# Patient Record
Sex: FEMALE | Race: WHITE | NOT HISPANIC OR LATINO | Employment: UNEMPLOYED | URBAN - METROPOLITAN AREA
[De-identification: names, ages, dates, MRNs, and addresses within clinical notes are randomized per-mention and may not be internally consistent; named-entity substitution may affect disease eponyms.]

---

## 2022-09-14 ENCOUNTER — OFFICE VISIT (OUTPATIENT)
Dept: FAMILY MEDICINE CLINIC | Facility: CLINIC | Age: 47
End: 2022-09-14

## 2022-09-14 VITALS
BODY MASS INDEX: 34.31 KG/M2 | SYSTOLIC BLOOD PRESSURE: 122 MMHG | TEMPERATURE: 97.1 F | WEIGHT: 201 LBS | HEART RATE: 81 BPM | HEIGHT: 64 IN | DIASTOLIC BLOOD PRESSURE: 90 MMHG | OXYGEN SATURATION: 100 %

## 2022-09-14 DIAGNOSIS — F41.9 ANXIETY: Primary | ICD-10-CM

## 2022-09-14 DIAGNOSIS — R56.9 SEIZURES: ICD-10-CM

## 2022-09-14 DIAGNOSIS — F19.10 POLYSUBSTANCE ABUSE: ICD-10-CM

## 2022-09-14 DIAGNOSIS — F33.41 RECURRENT MAJOR DEPRESSIVE DISORDER, IN PARTIAL REMISSION: ICD-10-CM

## 2022-09-14 PROCEDURE — 99204 OFFICE O/P NEW MOD 45 MIN: CPT | Performed by: PHYSICIAN ASSISTANT

## 2022-09-14 RX ORDER — DICLOFENAC SODIUM 75 MG/1
TABLET, DELAYED RELEASE ORAL
COMMUNITY
Start: 2022-07-05

## 2022-09-14 RX ORDER — GABAPENTIN 300 MG/1
300 CAPSULE ORAL 3 TIMES DAILY
COMMUNITY
Start: 2022-08-04

## 2022-09-14 RX ORDER — LEVETIRACETAM 1000 MG/1
1000 TABLET ORAL 2 TIMES DAILY
COMMUNITY
Start: 2022-07-25 | End: 2022-10-12

## 2022-09-14 RX ORDER — CLONIDINE HYDROCHLORIDE 0.1 MG/1
TABLET ORAL
COMMUNITY
Start: 2022-07-05 | End: 2022-09-14 | Stop reason: SDUPTHER

## 2022-09-14 RX ORDER — CLONIDINE HYDROCHLORIDE 0.1 MG/1
0.1 TABLET ORAL 2 TIMES DAILY
Qty: 60 TABLET | Refills: 1 | Status: SHIPPED | OUTPATIENT
Start: 2022-09-14

## 2022-09-14 RX ORDER — TOPIRAMATE 100 MG/1
100 TABLET, FILM COATED ORAL 2 TIMES DAILY
COMMUNITY
Start: 2022-08-15

## 2022-09-14 RX ORDER — DULOXETIN HYDROCHLORIDE 60 MG/1
60 CAPSULE, DELAYED RELEASE ORAL DAILY
COMMUNITY

## 2022-09-14 RX ORDER — OMEPRAZOLE 20 MG/1
20 TABLET, DELAYED RELEASE ORAL DAILY
COMMUNITY
Start: 2022-07-05 | End: 2022-09-14 | Stop reason: SDUPTHER

## 2022-09-14 RX ORDER — ARIPIPRAZOLE 2 MG/1
2 TABLET ORAL 2 TIMES DAILY
COMMUNITY
Start: 2022-08-15

## 2022-09-14 RX ORDER — GABAPENTIN 300 MG/1
300 CAPSULE ORAL 3 TIMES DAILY
Qty: 90 CAPSULE | Refills: 0 | Status: CANCELLED | OUTPATIENT
Start: 2022-09-14

## 2022-09-14 RX ORDER — IBUPROFEN 400 MG/1
400 TABLET ORAL EVERY 6 HOURS PRN
COMMUNITY
Start: 2022-08-29

## 2022-09-14 RX ORDER — RIZATRIPTAN BENZOATE 10 MG/1
10 TABLET, ORALLY DISINTEGRATING ORAL ONCE AS NEEDED
Qty: 9 TABLET | Refills: 0 | Status: SHIPPED | OUTPATIENT
Start: 2022-09-14

## 2022-09-14 RX ORDER — CLONAZEPAM 0.5 MG/1
0.5 TABLET ORAL 3 TIMES DAILY
COMMUNITY
Start: 2022-07-25

## 2022-09-14 RX ORDER — CLONAZEPAM 0.5 MG/1
0.5 TABLET ORAL 3 TIMES DAILY
Qty: 90 TABLET | Refills: 0 | Status: CANCELLED | OUTPATIENT
Start: 2022-09-14

## 2022-09-14 RX ORDER — NALOXONE HYDROCHLORIDE 4 MG/.1ML
4 SPRAY NASAL
COMMUNITY
Start: 2022-09-12 | End: 2023-09-12

## 2022-09-14 RX ORDER — OMEPRAZOLE 20 MG/1
20 TABLET, DELAYED RELEASE ORAL DAILY
Qty: 30 TABLET | Refills: 2 | Status: SHIPPED | OUTPATIENT
Start: 2022-09-14

## 2022-09-14 RX ORDER — RIZATRIPTAN BENZOATE 10 MG/1
TABLET, ORALLY DISINTEGRATING ORAL
COMMUNITY
Start: 2022-04-28 | End: 2022-09-14 | Stop reason: SDUPTHER

## 2022-09-14 RX ORDER — LOFEXIDINE HYDROCHLORIDE 0.2 MG/1
TABLET, FILM COATED ORAL
COMMUNITY
Start: 2022-07-27

## 2022-09-14 RX ORDER — BUPRENORPHINE AND NALOXONE 8; 2 MG/1; MG/1
FILM, SOLUBLE BUCCAL; SUBLINGUAL
COMMUNITY
Start: 2022-09-02

## 2022-09-14 RX ORDER — HYDROXYZINE PAMOATE 50 MG/1
50 CAPSULE ORAL 2 TIMES DAILY
COMMUNITY
Start: 2022-07-27

## 2022-09-14 RX ORDER — ONDANSETRON 4 MG/1
4 TABLET, FILM COATED ORAL EVERY 8 HOURS PRN
COMMUNITY
Start: 2022-07-27
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
## 2023-04-03 RX ORDER — CLONIDINE HYDROCHLORIDE 0.1 MG/1
TABLET ORAL
Qty: 60 TABLET | Refills: 0 | OUTPATIENT
Start: 2023-04-03

## 2023-04-03 NOTE — TELEPHONE ENCOUNTER
3rd attempt. Unable to contact    Rx Refill Note  Requested Prescriptions     Pending Prescriptions Disp Refills   • cloNIDine (CATAPRES) 0.1 MG tablet [Pharmacy Med Name: CLONIDINE HCL 0.1 MG TABS 0.1 Tablet] 60 tablet 0     Sig: TAKE ONE TABLET BY MOUTH 2 TIMES A DAY      Last office visit with prescribing clinician: 9/14/2022   Last telemedicine visit with prescribing clinician: Visit date not found   Next office visit with prescribing clinician: Visit date not found                         Would you like a call back once the refill request has been completed: [] Yes [] No    If the office needs to give you a call back, can they leave a voicemail: [] Yes [] No    Noemy Palomino MA  04/03/23, 09:11 EDT     No vm set up     Pt is due for 3 month f.u    HUB CAN RELY MESSAGE AND SCHEDULE

## 2023-05-19 ENCOUNTER — TELEPHONE (OUTPATIENT)
Dept: FAMILY MEDICINE CLINIC | Facility: CLINIC | Age: 48
End: 2023-05-19
Payer: COMMERCIAL

## 2023-05-19 RX ORDER — TOPIRAMATE 100 MG/1
100 TABLET, FILM COATED ORAL 2 TIMES DAILY
Status: CANCELLED | OUTPATIENT
Start: 2023-05-19

## 2023-05-19 RX ORDER — CLONIDINE HYDROCHLORIDE 0.1 MG/1
0.1 TABLET ORAL 2 TIMES DAILY
Qty: 60 TABLET | Refills: 1 | Status: CANCELLED | OUTPATIENT
Start: 2023-05-19

## 2023-05-19 NOTE — TELEPHONE ENCOUNTER
Caller: Alicia Martines    Relationship: Self    Best call back number: [phone]    Requested Prescriptions:   Requested Prescriptions     Pending Prescriptions Disp Refills   • cloNIDine (CATAPRES) 0.1 MG tablet 60 tablet 1     Sig: Take 1 tablet by mouth 2 (Two) Times a Day.   • topiramate (TOPAMAX) 100 MG tablet       Sig: Take 1 tablet by mouth 2 (Two) Times a Day.        Pharmacy where request should be sent: [address]  - [phone]  - [phone]     Last office visit with prescribing clinician: 9/14/2022   Last telemedicine visit with prescribing clinician: 3/30/2023   Next office visit with prescribing clinician: 7/12/2023     Additional details provided by patient: PATIENT IS OUT OF THESE, AS WELL AS KERA, CAN THAT BE FILLED AS WELL?    Does the patient have less than a 3 day supply:  [x] Yes  [] No    Would you like a call back once the refill request has been completed: [] Yes [] No    If the office needs to give you a call back, can they leave a voicemail: [] Yes [] No    Pura Cohen   05/19/23 12:02 EDT

## 2023-05-22 NOTE — TELEPHONE ENCOUNTER
States mobile phone not in service, called home and lvm regarding medications and to make a appt.    OK FOR HUB TO RELAY AND SCHEDULE

## 2023-05-26 ENCOUNTER — TELEPHONE (OUTPATIENT)
Dept: FAMILY MEDICINE CLINIC | Facility: CLINIC | Age: 48
End: 2023-05-26
Payer: COMMERCIAL

## 2023-05-26 RX ORDER — GABAPENTIN 300 MG/1
300 CAPSULE ORAL 3 TIMES DAILY
OUTPATIENT
Start: 2023-05-26

## 2023-05-26 RX ORDER — TOPIRAMATE 100 MG/1
100 TABLET, FILM COATED ORAL 2 TIMES DAILY
OUTPATIENT
Start: 2023-05-26

## 2023-05-26 RX ORDER — CLONIDINE HYDROCHLORIDE 0.1 MG/1
0.1 TABLET ORAL 2 TIMES DAILY
Qty: 60 TABLET | Refills: 1 | Status: SHIPPED | OUTPATIENT
Start: 2023-05-26

## 2023-05-26 RX ORDER — HYDROXYZINE PAMOATE 50 MG/1
50 CAPSULE ORAL 2 TIMES DAILY
OUTPATIENT
Start: 2023-05-26

## 2023-05-26 NOTE — TELEPHONE ENCOUNTER
Rx Refill Note  Requested Prescriptions     Pending Prescriptions Disp Refills   • topiramate (TOPAMAX) 100 MG tablet       Sig: Take 1 tablet by mouth 2 (Two) Times a Day.   • hydrOXYzine pamoate (VISTARIL) 50 MG capsule       Sig: Take 1 capsule by mouth 2 (Two) Times a Day.   • gabapentin (NEURONTIN) 300 MG capsule       Sig: Take 1 capsule by mouth 3 (Three) Times a Day.   • cloNIDine (CATAPRES) 0.1 MG tablet 60 tablet 1     Sig: Take 1 tablet by mouth 2 (Two) Times a Day.      Last office visit with prescribing clinician: 9/14/2022   Last telemedicine visit with prescribing clinician: Visit date not found   Next office visit with prescribing clinician: 5/26/2023                         Would you like a call back once the refill request has been completed: [] Yes [] No    If the office needs to give you a call back, can they leave a voicemail: [] Yes [] No    Noemy Palomino MA  05/26/23, 11:20 EDT

## 2023-05-26 NOTE — TELEPHONE ENCOUNTER
Caller: Alicia Martines    Relationship: Self    Best call back number: [phone]    Requested Prescriptions:   Requested Prescriptions     Pending Prescriptions Disp Refills   • topiramate (TOPAMAX) 100 MG tablet       Sig: Take 1 tablet by mouth 2 (Two) Times a Day.   • hydrOXYzine pamoate (VISTARIL) 50 MG capsule       Sig: Take 1 capsule by mouth 2 (Two) Times a Day.   • gabapentin (NEURONTIN) 300 MG capsule       Sig: Take 1 capsule by mouth 3 (Three) Times a Day.   • cloNIDine (CATAPRES) 0.1 MG tablet 60 tablet 1     Sig: Take 1 tablet by mouth 2 (Two) Times a Day.        Pharmacy where request should be sent: [address]  - [phone]  - [phone] FX     Last office visit with prescribing clinician: 9/14/2022   Last telemedicine visit with prescribing clinician: Visit date not found   Next office visit with prescribing clinician: 7/12/2023     Additional details provided by patient: PATIENT IS OUT AND HAS AN APPOINTMENT IN July BECAUSE THAT IS THE SOONEST SHE COULD GET IN.     Does the patient have less than a 3 day supply:  [x] Yes  [] No    Would you like a call back once the refill request has been completed: [] Yes [x] No    If the office needs to give you a call back, can they leave a voicemail: [] Yes [x] No    Cadance Dunaway, RegSched Rep   05/26/23 10:56 EDT

## 2023-05-26 NOTE — TELEPHONE ENCOUNTER
I am not familiar with this provider. If patient prefers, it is fine to fax her referral there. She was referred in September. Let me know if she needs a new referral.

## 2023-05-26 NOTE — TELEPHONE ENCOUNTER
I can refill her clonidine but I have not prescribed these other medications. She needs an appointment to discuss refills.

## 2023-05-26 NOTE — TELEPHONE ENCOUNTER
Caller: Alicia Martines    Relationship: Self    Best call back number: [phone]    What is the medical concern/diagnosis: PATIENT HAS SEIZURES AND MIGRAINES     What specialty or service is being requested: NEUROLOGY     What is the provider, practice or medical service name: [facility]      What is the office location: [address] [place] [address] [address], [place] [address]     What is the office phone number: [phone]     Any additional details: PATIENT NEEDS A NEUROLOGIST CLOSER TO WHERE SHE LIVES AND WOULD LIKE TO KNOW IF SHE CAN GET A REFERRAL TO THIS NEUROLOGIST. THEIR FAX NUMBER [phone].

## 2024-04-09 ENCOUNTER — HOSPITAL ENCOUNTER (EMERGENCY)
Facility: HOSPITAL | Age: 49
Discharge: HOME | End: 2024-04-09
Attending: EMERGENCY MEDICINE | Admitting: EMERGENCY MEDICINE
Payer: COMMERCIAL

## 2024-04-09 ENCOUNTER — APPOINTMENT (OUTPATIENT)
Dept: GENERAL RADIOLOGY | Facility: HOSPITAL | Age: 49
End: 2024-04-09
Payer: COMMERCIAL

## 2024-04-09 VITALS
HEART RATE: 93 BPM | DIASTOLIC BLOOD PRESSURE: 81 MMHG | WEIGHT: 160 LBS | RESPIRATION RATE: 17 BRPM | BODY MASS INDEX: 26.66 KG/M2 | TEMPERATURE: 98.7 F | SYSTOLIC BLOOD PRESSURE: 118 MMHG | HEIGHT: 65 IN | OXYGEN SATURATION: 96 %

## 2024-04-09 DIAGNOSIS — G89.29 CHRONIC HIP PAIN, RIGHT: Primary | ICD-10-CM

## 2024-04-09 DIAGNOSIS — M25.551 CHRONIC HIP PAIN, RIGHT: Primary | ICD-10-CM

## 2024-04-09 LAB
HOLD SPECIMEN: NORMAL
HOLD SPECIMEN: NORMAL
WHOLE BLOOD HOLD COAG: NORMAL
WHOLE BLOOD HOLD SPECIMEN: NORMAL

## 2024-04-09 PROCEDURE — 99283 EMERGENCY DEPT VISIT LOW MDM: CPT

## 2024-04-09 PROCEDURE — 96374 THER/PROPH/DIAG INJ IV PUSH: CPT

## 2024-04-09 PROCEDURE — 25010000002 KETOROLAC TROMETHAMINE PER 15 MG: Performed by: EMERGENCY MEDICINE

## 2024-04-09 PROCEDURE — 73502 X-RAY EXAM HIP UNI 2-3 VIEWS: CPT

## 2024-04-09 RX ORDER — OMEGA-3/DHA/EPA/FISH OIL 35-113.5MG
1 TABLET,CHEWABLE ORAL DAILY
Status: ON HOLD | COMMUNITY
Start: 2024-02-08

## 2024-04-09 RX ORDER — BUPRENORPHINE HYDROCHLORIDE AND NALOXONE HYDROCHLORIDE DIHYDRATE 8; 2 MG/1; MG/1
TABLET SUBLINGUAL
Status: ON HOLD | COMMUNITY
Start: 2024-04-01

## 2024-04-09 RX ORDER — LIDOCAINE 4 G/G
1 PATCH TOPICAL
Status: DISCONTINUED | OUTPATIENT
Start: 2024-04-09 | End: 2024-04-09 | Stop reason: HOSPADM

## 2024-04-09 RX ORDER — CYCLOBENZAPRINE HCL 10 MG
10 TABLET ORAL ONCE
Status: COMPLETED | OUTPATIENT
Start: 2024-04-09 | End: 2024-04-09

## 2024-04-09 RX ORDER — IBUPROFEN 600 MG/1
600 TABLET ORAL 3 TIMES DAILY
Qty: 15 TABLET | Refills: 0 | Status: ON HOLD | OUTPATIENT
Start: 2024-04-09

## 2024-04-09 RX ORDER — ERGOCALCIFEROL 1.25 MG/1
1 CAPSULE ORAL WEEKLY
Status: ON HOLD | COMMUNITY
Start: 2024-02-08

## 2024-04-09 RX ORDER — KETOROLAC TROMETHAMINE 30 MG/ML
30 INJECTION, SOLUTION INTRAMUSCULAR; INTRAVENOUS ONCE
Status: DISCONTINUED | OUTPATIENT
Start: 2024-04-09 | End: 2024-04-09

## 2024-04-09 RX ORDER — OMEPRAZOLE 20 MG/1
CAPSULE, DELAYED RELEASE ORAL
Status: ON HOLD | COMMUNITY
Start: 2024-02-24

## 2024-04-09 RX ORDER — CLONIDINE HYDROCHLORIDE 0.1 MG/1
0.1 TABLET ORAL 2 TIMES DAILY
Status: ON HOLD | COMMUNITY
Start: 2024-04-01

## 2024-04-09 RX ORDER — SODIUM CHLORIDE 0.9 % (FLUSH) 0.9 %
10 SYRINGE (ML) INJECTION AS NEEDED
Status: DISCONTINUED | OUTPATIENT
Start: 2024-04-09 | End: 2024-04-09 | Stop reason: HOSPADM

## 2024-04-09 RX ORDER — KETOROLAC TROMETHAMINE 30 MG/ML
30 INJECTION, SOLUTION INTRAMUSCULAR; INTRAVENOUS ONCE
Status: COMPLETED | OUTPATIENT
Start: 2024-04-09 | End: 2024-04-09

## 2024-04-09 RX ORDER — CYCLOBENZAPRINE HCL 5 MG
5 TABLET ORAL 3 TIMES DAILY PRN
Qty: 9 TABLET | Refills: 0 | Status: ON HOLD | OUTPATIENT
Start: 2024-04-09

## 2024-04-09 RX ORDER — HYDROXYZINE PAMOATE 25 MG/1
CAPSULE ORAL
Status: ON HOLD | COMMUNITY
Start: 2024-04-01

## 2024-04-09 RX ADMIN — KETOROLAC TROMETHAMINE 30 MG: 30 INJECTION, SOLUTION INTRAMUSCULAR; INTRAVENOUS at 10:49

## 2024-04-09 RX ADMIN — LIDOCAINE 1 PATCH: 4 PATCH TOPICAL at 10:52

## 2024-04-09 RX ADMIN — CYCLOBENZAPRINE 10 MG: 10 TABLET, FILM COATED ORAL at 10:49

## 2024-04-09 NOTE — ED PROVIDER NOTES
[facility] EMERGENCY DEPARTMENT  Emergency Department Encounter  Emergency Medicine Physician Note     Pt Name:Alicia Martines  MRN: [id number]  Birthdate 1975  Date of evaluation: 2024  PCP:  Provider, No Known  Note Started: 9:42 AM EDT      CHIEF COMPLAINT       Chief Complaint   Patient presents with    Hip Pain       HISTORY OF PRESENT ILLNESS  (Location/Symptom, Timing/Onset, Context/Setting, Quality, Duration, Modifying Factors, Severity.)      Alicia Martines is a 49 y.o. female who presents with chronic right hip pain, describes right hip pain worsening over the last couple months.  Patient describes acute worsening pain in the last 2 days.  Patient states that it is difficult to ambulate around her house.  Patient denies any numbness and tingling in her distal legs.  Patient denies any shooting pain.  Patient describes it deep inside the hip.    PAST MEDICAL / SURGICAL / SOCIAL / FAMILY HISTORY     Past Medical History:   Diagnosis Date    Acid reflux     Anemia     Anxiety     Arthritis     Depression     Diabetes mellitus     Fibromyalgia, primary     Fractures     Gout     Hypertension     Migraine headache     Ovarian cyst     PTSD (post-traumatic stress disorder)     Restless leg syndrome     Seizures      No additional pertinent       Past Surgical History:   Procedure Laterality Date    KNEE SURGERY      MANDIBLE FRACTURE SURGERY       No additional pertinent       Social History     Socioeconomic History    Marital status: Single   Tobacco Use    Smoking status: Former     Current packs/day: 0.00     Average packs/day: 1 pack/day for 30.0 years (30.0 ttl pk-yrs)     Types: Cigarettes     Start date:      Quit date:      Years since quittin.2    Smokeless tobacco: Never   Vaping Use    Vaping status: Every Day    Substances: Nicotine, Flavoring   Substance and Sexual Activity    Alcohol use: Yes    Drug use: Not Currently     Comment: opoids    Sexual  "activity: Not Currently       Family History   Problem Relation Age of Onset    Pancreatic cancer Mother     Heart attack Mother     Mental illness Brother     Arthritis Maternal Aunt     Throat cancer Maternal Aunt     Bone cancer Maternal Aunt     Hypertension Maternal Uncle     Liver cancer Maternal Uncle     Arthritis Paternal Uncle     Osteoporosis Maternal Grandmother     Diabetes Maternal Grandfather        Allergies:  Codeine and Penicillins    Home Medications:  Prior to Admission medications    Medication Sig Start Date End Date Taking? Authorizing Provider   albuterol sulfate  (90 Base) MCG/ACT inhaler Two puffs per day every 4-6 hours as needed for wheeze or shortness of breath. 11/2/23   Tatyana Ugalde APRN   levETIRAcetam (KEPPRA) 1000 MG tablet Take 1 tablet by mouth 2 (Two) Times a Day. 10/1/23   Bettye Alston APRN   ondansetron ODT (ZOFRAN-ODT) 8 MG disintegrating tablet Place 1 tablet on the tongue Every 8 (Eight) Hours As Needed for Nausea or Vomiting. 11/2/23   Tatyana Ugalde APRN   promethazine-dextromethorphan (PROMETHAZINE-DM) 6.25-15 MG/5ML syrup Take 5 mL by mouth 4 (Four) Times a Day As Needed for Cough. 11/2/23   Tatyana Ugalde APRN         REVIEW OF SYSTEMS       Review of Systems   Constitutional:  Negative for chills and fever.   Endocrine: Negative for polyuria.   Genitourinary:  Negative for difficulty urinating and dysuria.   Musculoskeletal:  Positive for arthralgias, gait problem and myalgias. Negative for back pain.   Neurological:  Negative for weakness and numbness.       PHYSICAL EXAM      INITIAL VITALS:   /81   Pulse 93   Temp 98.7 °F (37.1 °C) (Oral)   Resp 17   Ht 165.1 cm (65\")   Wt 72.6 kg (160 lb)   SpO2 96%   BMI 26.63 kg/m²     Physical Exam  Constitutional:       Appearance: Normal appearance.   HENT:      Head: Normocephalic and atraumatic.   Cardiovascular:      Pulses: Normal pulses.      Comments: Distal PT pulses noted to be equal " bilaterally  Pulmonary:      Effort: Pulmonary effort is normal.   Musculoskeletal:         General: Tenderness (Tenderness to palpation of the greater trochanter on the right.) present. No swelling, deformity or signs of injury.   Skin:     General: Skin is warm and dry.   Neurological:      General: No focal deficit present.      Mental Status: She is alert and oriented to person, place, and time.      Sensory: No sensory deficit.   Psychiatric:         Mood and Affect: Mood normal.         Behavior: Behavior normal.           DDX/DIAGNOSTIC RESULTS / EMERGENCY DEPARTMENT COURSE / MDM     Differential Diagnosis included but not limited: Hip fracture, osteoarthritis, musculoskeletal injury    Diagnoses Considered but Do Not Suspect: Sciatica, avascular necrosis, joint infection    Decision Rules/Scores utilized: N/A     Tests considered but not ordered and why:  N/A     MIPS: N/A     Code Status Discussion:  Not Discussed    Additional Patient Education Provided: None     Medical Decision Making    Medical Decision Making  Patient presented with right hip pain, motor sensation and pulses intact.  Patient appears clinically well.  No redness erythema or warmth, low concerns for infectious processes.  X-ray imaging demonstrated no concerns for fracture, low concern for severe causes of patient's hip pain at this time.  Feel it is more musculoskeletal, patient improvement of pain with ibuprofen and Flexeril.  Patient stable for discharge.  Patient structured to follow-up with orthopedic surgery and her PCP.  Patient strict return for any worsening pain or any other concerns.    Problems Addressed:  Chronic hip pain, right: complicated acute illness or injury    Amount and/or Complexity of Data Reviewed  Radiology: ordered.    Risk  OTC drugs.  Prescription drug management.        See ED COURSE for additional MDM statements    EKG  None Performed     All EKG's are interpreted by the Emergency Department Physician who  either signs or Co-signs this chart in the absence of a cardiologist.    Additional Scores                   EMERGENCY DEPARTMENT COURSE:         PROCEDURES:  None Performed   Procedures    DATA FOR LAB AND RADIOLOGY TESTS ORDERED BELOW ARE REVIEWED BY THE ED CLINICIAN:    RADIOLOGY: All x-rays, CT, MRI, and formal ultrasound images (except ED bedside ultrasound) are read by the radiologist, see reports below, unless otherwise noted in MDM or here.  Reports below are reviewed by myself.  XR Hip With or Without Pelvis 2 - 3 View Right   Final Result   No acute fracture.                                    Images were reviewed, interpreted, and dictated by Dr. Jasmin Briones MD   Transcribed by Silvia Campo PA-C.       This report was signed and finalized on 4/9/2024 11:28 AM by Jasmin Briones MD.              LABS: Lab orders shown below, the results are reviewed by myself, and all abnormals are listed below.  Labs Reviewed   RAINBOW DRAW    Narrative:     The following orders were created for panel order [place] Draw.  Procedure                               Abnormality         Status                     ---------                               -----------         ------                     Green Top (Gel)[[id number]]                                  Final result               Lavender Top[[id number]]                                     Final result               Gold Top - SST[[id number]]                                   Final result               Light Blue Top[[id number]]                                   Final result                 Please view results for these tests on the individual orders.   GREEN TOP   LAVENDER TOP   GOLD TOP - SST   LIGHT BLUE TOP       Vitals Reviewed:    Vitals:    04/09/24 1030 04/09/24 1100 04/09/24 1130 04/09/24 1200   BP: 138/91 130/99 129/85 118/81   Patient Position:       Pulse:       Resp:       Temp:       TempSrc:       SpO2: 97% 96% 96%    Weight:       Height:            MEDICATIONS GIVEN TO PATIENT THIS ENCOUNTER:  Medications   cyclobenzaprine (FLEXERIL) tablet 10 mg (10 mg Oral Given 4/9/24 1049)   ketorolac (TORADOL) injection 30 mg (30 mg Intravenous Given 4/9/24 1049)       CONSULTS:  None    CRITICAL CARE:  There was significant risk of life threatening deterioration of patient's condition requiring my direct management. Critical care time  minutes, excluding any documented procedures.    FINAL IMPRESSION      1. Chronic hip pain, right          DISPOSITION / PLAN     ED Disposition       ED Disposition   Discharge    Condition   Stable    Comment   --               PATIENT REFERRED TO:  [facility] [address]  [phone]  Schedule an appointment as soon as possible for a visit in 3 days  Call to schedule primary care appointment in the next couple days.    Bhupinder Alvarado MD  [address] [place] [id number]  [phone]    Schedule an appointment as soon as possible for a visit in 2 weeks  If you continue having hip pain.      DISCHARGE MEDICATIONS:     Medication List        START taking these medications      cyclobenzaprine 5 MG tablet  Commonly known as: FLEXERIL  Take 1 tablet by mouth 3 (Three) Times a Day As Needed for Muscle Spasms.     ibuprofen 600 MG tablet  Commonly known as: ADVIL,MOTRIN  Take 1 tablet by mouth 3 (Three) Times a Day.            CONTINUE taking these medications      albuterol sulfate  (90 Base) MCG/ACT inhaler  Commonly known as: PROVENTIL HFA;VENTOLIN HFA;PROAIR HFA  Two puffs per day every 4-6 hours as needed for wheeze or shortness of breath.     buprenorphine-naloxone 8-2 MG per SL tablet  Commonly known as: SUBOXONE     cloNIDine 0.1 MG tablet  Commonly known as: CATAPRES     [facility] Vitamin B-12 1000 MCG tablet  Generic drug: cyanocobalamin     hydrOXYzine pamoate 25 MG capsule  Commonly known as: VISTARIL     levETIRAcetam 1000 MG tablet  Commonly known as: KEPPRA  Take 1 tablet by mouth  2 (Two) Times a Day.     omeprazole 20 MG capsule  Commonly known as: priLOSEC     ondansetron ODT 8 MG disintegrating tablet  Commonly known as: ZOFRAN-ODT  Place 1 tablet on the tongue Every 8 (Eight) Hours As Needed for Nausea or Vomiting.     promethazine-dextromethorphan 6.25-15 MG/5ML syrup  Commonly known as: PROMETHAZINE-DM  Take 5 mL by mouth 4 (Four) Times a Day As Needed for Cough.     vitamin D 1.25 MG (99152 UT) capsule capsule  Commonly known as: ERGOCALCIFEROL               Where to Get Your Medications        These medications were sent to [facility], [place] - [address] [facility] [address] at .[phone]  - [phone]   [address] [facility] [facility] [address]      Phone: [phone]   cyclobenzaprine 5 MG tablet  ibuprofen 600 MG tablet         Electronically signed by Abdullahi Parr DO, 04/09/24, 9:42 AM EDT.    Emergency Medicine Physician  [facility]  (Please note that portions of thisnote were completed with a voice recognition program.  Efforts were made to edit the dictations but occasionally words are mis-transcribed.)      Abdullahi Parr DO  04/10/24 1529

## 2024-04-09 NOTE — DISCHARGE INSTRUCTIONS
If you notice any concerning symptoms, please return to the ER immediately. These can include but are not limited to: worsening of you condition, fevers, chills, shortness of breath, vomiting, weakness of the extremities, changes in your mental status, numbness, pale extremities, or chest pain.     Take medications as prescribed, your pharmacist may have additional recommendations concerning these medications.    For pain use ibuprofen (Motrin) or acetaminophen (Tylenol), unless prescribed medications that also contain these medications.  You can take over the counter acetaminophen or ibuprofen, please follow the directions as dosages differ. Do not take ibuprofen if you have a history of peptic ulcers, kidney disease, bariatric surgery, or are currently pregnant.  Do not take Tylenol if you have a history of liver disease or alcohol use disorder.        THANK YOU!!! From [facility] Emergency Department    On behalf of the Emergency Department staff at [facility], I would like to thank you for giving us the opportunity to address your health care needs and concerns. We hope that during your visit, our service was delivered in a professional and caring manner. Please keep [facility] in mind as we walk with you down the path to your own personal wellness. Please expect follow-up phone calls concerning additional care and questions about your experience.      You have received additional information specific to your diagnosis in these discharge instructions, please read these fully.  Anytime you have been seen in the emergency department we recommend close follow up with your primary care provider or specialist, please follow these directions as indicated.

## 2024-04-14 ENCOUNTER — HOSPITAL ENCOUNTER (EMERGENCY)
Facility: HOSPITAL | Age: 49
Discharge: PSYCHIATRIC OR SUBSTANCE USE FACILITY | DRG: 885 | End: 2024-04-15
Attending: EMERGENCY MEDICINE | Admitting: STUDENT IN AN ORGANIZED HEALTH CARE EDUCATION/TRAINING PROGRAM
Payer: COMMERCIAL

## 2024-04-14 DIAGNOSIS — F22 PARANOIA: ICD-10-CM

## 2024-04-14 DIAGNOSIS — F41.9 ANXIETY: Primary | ICD-10-CM

## 2024-04-14 LAB
AMPHET+METHAMPHET UR QL: NEGATIVE
AMPHETAMINES UR QL: NEGATIVE
BARBITURATES UR QL SCN: NEGATIVE
BENZODIAZ UR QL SCN: NEGATIVE
BILIRUB UR QL STRIP: NEGATIVE
BUPRENORPHINE SERPL-MCNC: POSITIVE NG/ML
CANNABINOIDS SERPL QL: NEGATIVE
CLARITY UR: ABNORMAL
COCAINE UR QL: NEGATIVE
COLOR UR: YELLOW
FENTANYL UR-MCNC: NEGATIVE NG/ML
GLUCOSE UR STRIP-MCNC: NEGATIVE MG/DL
HGB UR QL STRIP.AUTO: NEGATIVE
KETONES UR QL STRIP: ABNORMAL
LEUKOCYTE ESTERASE UR QL STRIP.AUTO: NEGATIVE
METHADONE UR QL SCN: NEGATIVE
NITRITE UR QL STRIP: NEGATIVE
OPIATES UR QL: NEGATIVE
OXYCODONE UR QL SCN: NEGATIVE
PCP UR QL SCN: NEGATIVE
PH UR STRIP.AUTO: 6.5 [PH] (ref 5–8)
PROT UR QL STRIP: NEGATIVE
SP GR UR STRIP: 1.01 (ref 1–1.03)
TRICYCLICS UR QL SCN: NEGATIVE
UROBILINOGEN UR QL STRIP: ABNORMAL

## 2024-04-14 PROCEDURE — 99285 EMERGENCY DEPT VISIT HI MDM: CPT

## 2024-04-14 PROCEDURE — 81003 URINALYSIS AUTO W/O SCOPE: CPT | Performed by: PHYSICIAN ASSISTANT

## 2024-04-14 PROCEDURE — 93005 ELECTROCARDIOGRAM TRACING: CPT | Performed by: PHYSICIAN ASSISTANT

## 2024-04-14 PROCEDURE — 80307 DRUG TEST PRSMV CHEM ANLYZR: CPT | Performed by: PHYSICIAN ASSISTANT

## 2024-04-14 PROCEDURE — 36415 COLL VENOUS BLD VENIPUNCTURE: CPT

## 2024-04-15 ENCOUNTER — HOSPITAL ENCOUNTER (INPATIENT)
Facility: HOSPITAL | Age: 49
LOS: 4 days | Discharge: HOME | DRG: 885 | End: 2024-04-19
Attending: PSYCHIATRY & NEUROLOGY | Admitting: PSYCHIATRY & NEUROLOGY
Payer: COMMERCIAL

## 2024-04-15 VITALS
DIASTOLIC BLOOD PRESSURE: 97 MMHG | SYSTOLIC BLOOD PRESSURE: 144 MMHG | TEMPERATURE: 98.1 F | HEIGHT: 65 IN | RESPIRATION RATE: 19 BRPM | WEIGHT: 160 LBS | BODY MASS INDEX: 26.66 KG/M2 | OXYGEN SATURATION: 98 % | HEART RATE: 86 BPM

## 2024-04-15 DIAGNOSIS — R56.9 SEIZURES: ICD-10-CM

## 2024-04-15 DIAGNOSIS — F11.21 OPIOID DEPENDENCE IN REMISSION: Primary | Chronic | ICD-10-CM

## 2024-04-15 PROBLEM — F22 PARANOIA: Status: ACTIVE | Noted: 2024-04-15

## 2024-04-15 LAB
ALBUMIN SERPL-MCNC: 4.2 G/DL (ref 3.5–5.2)
ALBUMIN/GLOB SERPL: 1.5 G/DL
ALP SERPL-CCNC: 57 U/L (ref 39–117)
ALT SERPL W P-5'-P-CCNC: 8 U/L (ref 1–33)
ANION GAP SERPL CALCULATED.3IONS-SCNC: 11.9 MMOL/L (ref 5–15)
APAP SERPL-MCNC: <5 MCG/ML (ref 0–30)
AST SERPL-CCNC: 12 U/L (ref 1–32)
BASOPHILS # BLD AUTO: 0.02 10*3/MM3 (ref 0–0.2)
BASOPHILS NFR BLD AUTO: 0.2 % (ref 0–1.5)
BILIRUB SERPL-MCNC: 0.3 MG/DL (ref 0–1.2)
BUN SERPL-MCNC: 6 MG/DL (ref 6–20)
BUN/CREAT SERPL: 9.1 (ref 7–25)
CALCIUM SPEC-SCNC: 9.4 MG/DL (ref 8.6–10.5)
CHLORIDE SERPL-SCNC: 100 MMOL/L (ref 98–107)
CO2 SERPL-SCNC: 25.1 MMOL/L (ref 22–29)
CREAT SERPL-MCNC: 0.66 MG/DL (ref 0.57–1)
DEPRECATED RDW RBC AUTO: 40 FL (ref 37–54)
EGFRCR SERPLBLD CKD-EPI 2021: 109 ML/MIN/1.73
EOSINOPHIL # BLD AUTO: 0.19 10*3/MM3 (ref 0–0.4)
EOSINOPHIL NFR BLD AUTO: 2.3 % (ref 0.3–6.2)
ERYTHROCYTE [DISTWIDTH] IN BLOOD BY AUTOMATED COUNT: 13.1 % (ref 12.3–15.4)
ETHANOL BLD-MCNC: <10 MG/DL (ref 0–10)
ETHANOL UR QL: <0.01 %
FLUAV RNA RESP QL NAA+PROBE: NOT DETECTED
FLUBV RNA RESP QL NAA+PROBE: NOT DETECTED
GLOBULIN UR ELPH-MCNC: 2.8 GM/DL
GLUCOSE SERPL-MCNC: 101 MG/DL (ref 65–99)
HCT VFR BLD AUTO: 39.1 % (ref 34–46.6)
HGB BLD-MCNC: 13.4 G/DL (ref 12–15.9)
IMM GRANULOCYTES # BLD AUTO: 0.02 10*3/MM3 (ref 0–0.05)
IMM GRANULOCYTES NFR BLD AUTO: 0.2 % (ref 0–0.5)
LYMPHOCYTES # BLD AUTO: 3.79 10*3/MM3 (ref 0.7–3.1)
LYMPHOCYTES NFR BLD AUTO: 45.8 % (ref 19.6–45.3)
MCH RBC QN AUTO: 28.9 PG (ref 26.6–33)
MCHC RBC AUTO-ENTMCNC: 34.3 G/DL (ref 31.5–35.7)
MCV RBC AUTO: 84.4 FL (ref 79–97)
MONOCYTES # BLD AUTO: 0.43 10*3/MM3 (ref 0.1–0.9)
MONOCYTES NFR BLD AUTO: 5.2 % (ref 5–12)
NEUTROPHILS NFR BLD AUTO: 3.83 10*3/MM3 (ref 1.7–7)
NEUTROPHILS NFR BLD AUTO: 46.3 % (ref 42.7–76)
NRBC BLD AUTO-RTO: 0 /100 WBC (ref 0–0.2)
PLATELET # BLD AUTO: 325 10*3/MM3 (ref 140–450)
PMV BLD AUTO: 9.7 FL (ref 6–12)
POTASSIUM SERPL-SCNC: 3.6 MMOL/L (ref 3.5–5.2)
PROT SERPL-MCNC: 7 G/DL (ref 6–8.5)
RBC # BLD AUTO: 4.63 10*6/MM3 (ref 3.77–5.28)
SALICYLATES SERPL-MCNC: <0.3 MG/DL
SARS-COV-2 RNA RESP QL NAA+PROBE: NOT DETECTED
SODIUM SERPL-SCNC: 137 MMOL/L (ref 136–145)
TSH SERPL DL<=0.05 MIU/L-ACNC: 4.25 UIU/ML (ref 0.27–4.2)
WBC NRBC COR # BLD AUTO: 8.28 10*3/MM3 (ref 3.4–10.8)

## 2024-04-15 PROCEDURE — 82077 ASSAY SPEC XCP UR&BREATH IA: CPT | Performed by: PHYSICIAN ASSISTANT

## 2024-04-15 PROCEDURE — 80179 DRUG ASSAY SALICYLATE: CPT | Performed by: PHYSICIAN ASSISTANT

## 2024-04-15 PROCEDURE — 36415 COLL VENOUS BLD VENIPUNCTURE: CPT

## 2024-04-15 PROCEDURE — 80143 DRUG ASSAY ACETAMINOPHEN: CPT | Performed by: PHYSICIAN ASSISTANT

## 2024-04-15 PROCEDURE — 80050 GENERAL HEALTH PANEL: CPT | Performed by: PHYSICIAN ASSISTANT

## 2024-04-15 PROCEDURE — 87636 SARSCOV2 & INF A&B AMP PRB: CPT | Performed by: PHYSICIAN ASSISTANT

## 2024-04-15 RX ORDER — OLANZAPINE 5 MG/1
10 TABLET, ORALLY DISINTEGRATING ORAL 2 TIMES DAILY
Status: DISCONTINUED | OUTPATIENT
Start: 2024-04-15 | End: 2024-04-19 | Stop reason: HOSPADM

## 2024-04-15 RX ORDER — DIPHENHYDRAMINE HYDROCHLORIDE 50 MG/ML
50 INJECTION INTRAMUSCULAR; INTRAVENOUS EVERY 6 HOURS PRN
Status: DISCONTINUED | OUTPATIENT
Start: 2024-04-15 | End: 2024-04-19 | Stop reason: HOSPADM

## 2024-04-15 RX ORDER — LORAZEPAM 2 MG/1
2 TABLET ORAL EVERY 6 HOURS PRN
Status: DISCONTINUED | OUTPATIENT
Start: 2024-04-15 | End: 2024-04-19 | Stop reason: HOSPADM

## 2024-04-15 RX ORDER — ALBUTEROL SULFATE 90 UG/1
2 AEROSOL, METERED RESPIRATORY (INHALATION) EVERY 6 HOURS PRN
Status: DISCONTINUED | OUTPATIENT
Start: 2024-04-15 | End: 2024-04-19 | Stop reason: HOSPADM

## 2024-04-15 RX ORDER — ALUMINA, MAGNESIA, AND SIMETHICONE 2400; 2400; 240 MG/30ML; MG/30ML; MG/30ML
15 SUSPENSION ORAL EVERY 6 HOURS PRN
Status: DISCONTINUED | OUTPATIENT
Start: 2024-04-15 | End: 2024-04-19 | Stop reason: HOSPADM

## 2024-04-15 RX ORDER — HYDROXYZINE HYDROCHLORIDE 25 MG/1
50 TABLET, FILM COATED ORAL EVERY 6 HOURS PRN
Status: DISCONTINUED | OUTPATIENT
Start: 2024-04-15 | End: 2024-04-19 | Stop reason: HOSPADM

## 2024-04-15 RX ORDER — HALOPERIDOL 5 MG/ML
5 INJECTION INTRAMUSCULAR EVERY 6 HOURS PRN
Status: DISCONTINUED | OUTPATIENT
Start: 2024-04-15 | End: 2024-04-19 | Stop reason: HOSPADM

## 2024-04-15 RX ORDER — CHOLECALCIFEROL (VITAMIN D3) 125 MCG
1000 CAPSULE ORAL DAILY
Status: DISCONTINUED | OUTPATIENT
Start: 2024-04-15 | End: 2024-04-19 | Stop reason: HOSPADM

## 2024-04-15 RX ORDER — TRAZODONE HYDROCHLORIDE 50 MG/1
50 TABLET ORAL NIGHTLY PRN
Status: DISCONTINUED | OUTPATIENT
Start: 2024-04-15 | End: 2024-04-19 | Stop reason: HOSPADM

## 2024-04-15 RX ORDER — LIDOCAINE 50 MG/G
1 PATCH TOPICAL EVERY 24 HOURS
COMMUNITY
End: 2024-04-19 | Stop reason: HOSPADM

## 2024-04-15 RX ORDER — LEVETIRACETAM 500 MG/1
1000 TABLET ORAL 2 TIMES DAILY
Status: DISCONTINUED | OUTPATIENT
Start: 2024-04-15 | End: 2024-04-19 | Stop reason: HOSPADM

## 2024-04-15 RX ORDER — HYDROXYZINE PAMOATE 50 MG/1
50 CAPSULE ORAL ONCE
Status: COMPLETED | OUTPATIENT
Start: 2024-04-15 | End: 2024-04-15

## 2024-04-15 RX ORDER — LOPERAMIDE HYDROCHLORIDE 2 MG/1
2 CAPSULE ORAL
Status: DISCONTINUED | OUTPATIENT
Start: 2024-04-15 | End: 2024-04-19 | Stop reason: HOSPADM

## 2024-04-15 RX ORDER — LORAZEPAM 2 MG/ML
2 INJECTION INTRAMUSCULAR EVERY 6 HOURS PRN
Status: DISCONTINUED | OUTPATIENT
Start: 2024-04-15 | End: 2024-04-19 | Stop reason: HOSPADM

## 2024-04-15 RX ORDER — LIDOCAINE 4 G/G
1 PATCH TOPICAL
Status: DISCONTINUED | OUTPATIENT
Start: 2024-04-15 | End: 2024-04-19 | Stop reason: HOSPADM

## 2024-04-15 RX ORDER — LORAZEPAM 0.5 MG/1
1 TABLET ORAL ONCE
Status: COMPLETED | OUTPATIENT
Start: 2024-04-15 | End: 2024-04-15

## 2024-04-15 RX ORDER — HALOPERIDOL 5 MG/1
5 TABLET ORAL EVERY 6 HOURS PRN
Status: DISCONTINUED | OUTPATIENT
Start: 2024-04-15 | End: 2024-04-19 | Stop reason: HOSPADM

## 2024-04-15 RX ORDER — DIPHENHYDRAMINE HCL 25 MG
50 CAPSULE ORAL EVERY 6 HOURS PRN
Status: DISCONTINUED | OUTPATIENT
Start: 2024-04-15 | End: 2024-04-19 | Stop reason: HOSPADM

## 2024-04-15 RX ORDER — ACETAMINOPHEN 325 MG/1
650 TABLET ORAL EVERY 4 HOURS PRN
Status: DISCONTINUED | OUTPATIENT
Start: 2024-04-15 | End: 2024-04-19 | Stop reason: HOSPADM

## 2024-04-15 RX ORDER — AMOXICILLIN 250 MG
1 CAPSULE ORAL DAILY PRN
COMMUNITY
End: 2024-04-19 | Stop reason: HOSPADM

## 2024-04-15 RX ADMIN — CYANOCOBALAMIN TAB 500 MCG 1000 MCG: 500 TAB at 17:21

## 2024-04-15 RX ADMIN — LORAZEPAM 1 MG: 0.5 TABLET ORAL at 02:30

## 2024-04-15 RX ADMIN — HYDROXYZINE PAMOATE 50 MG: 50 CAPSULE ORAL at 01:48

## 2024-04-15 RX ADMIN — LIDOCAINE 1 PATCH: 4 PATCH TOPICAL at 17:28

## 2024-04-15 RX ADMIN — LEVETIRACETAM 1000 MG: 500 TABLET, FILM COATED ORAL at 20:13

## 2024-04-15 RX ADMIN — HYDROXYZINE HYDROCHLORIDE 50 MG: 25 TABLET, FILM COATED ORAL at 20:14

## 2024-04-15 RX ADMIN — OLANZAPINE 10 MG: 5 TABLET, ORALLY DISINTEGRATING ORAL at 08:32

## 2024-04-15 RX ADMIN — OLANZAPINE 10 MG: 5 TABLET, ORALLY DISINTEGRATING ORAL at 20:13

## 2024-04-15 RX ADMIN — LIDOCAINE 1 PATCH: 4 PATCH TOPICAL at 18:40

## 2024-04-15 NOTE — SIGNIFICANT NOTE
"   04/15/24 1539   Pertinent Diagnosis/Presenting Problems   Presenting Problems/Reason for Referral Paranoia   Previous Problems Impacting Recreation Patient reported no perceived barriers to leisure participation   Lifestyle/Recreational History/Interest   Profession/Job Unemployed   Current or Previous  Service none   Current Living Situation other (see comments)  (Sober living)   Transportation Situation no reliable method of transportation   Current Educational Level 11th grade   Support Network Patient metioned a daughter that moved to [place].   Recreational History and Interests   How Important is Recreation to You? somewhat important   Satisfied With Leisure Lifestyle? yes   Participate Regularly in Leisure Activity? no   Are You a Social Person? other (see comments)  (Patient reported \"It doesn't matter\")   Do You Prefer To Be Alone? other (see comments)  (Patient reported \"It doesn't matter\")   Do You Like New Experiences? no   Current Hobbies/Interests exercise/fitness   Exercise/Fitness walking/running   Past Hobbies/Interests music   Music (Past Leisure Activity) listening to music   Barriers To Leisure Activity   Barriers to Leisure Activity mental health concerns;transportation concerns   Coping/Wellbeing   Describe Yourself Patient described herself as scared and alone   Personal Strengths Resilience, motivation, self-advocacy   Current State of Wellbeing increased stressors;poor coping skills   How Do You Ephraim With Life Stressors? Patient reports that she rethinks the conversation to find the right words   Factors Impacting Current State of Wellbeing coping skills/strategies;increase in stressors;supportive social network or family   Therapeutic Recreation Participation   Recreation Therapy Participation arts/crafts;exercise/fitness;games;meditation;music;puzzles;reading;trivia;television/movies/videos;writing/journaling/blogging   Art/Crafts drawing;painting   Exercise/Fitness " strengthening/weight training;group exercise activity   Games board games;card games;brain games/fitness   Music listening to music   Reading books   Objectives of Recreation Participation motivation and activity level through successful participation;positive attitudes leading to a healthy leisure lifestyle   Orientation to Therapeutic Recreation patient;received explanation of recreation therapy programs;therapeutic recreation program initiated   Recreation Therapy Summary of Participation Patient pleasant and able to complete assessment with some prompting as she kept falling asleep during assessment. Patient had no identified leisure goals for hospitalization.   Therapeutic Recreation Assessment/Plan   Patient/Family Goal (Therapeutic Recreation) Increase attention span and focus  Increase knowledge and utilization of leisure activities as coping skills  Decrease symptom burden through leisure participation  Increase awareness of local resources   Recreation Therapy Goals/Objectives coping skills/strategies;functional leisure skills;health promotion;health maintenance;leisure awareness;leisure skills/knowledge;leisure participation;problem-solving;relaxation response   Recreation Plan structured leisure participation   Referrals to Community Recreation Programs List available on request

## 2024-04-15 NOTE — ED PROVIDER NOTES
Subjective:  Chief Complaint:  Psych eval.    History of Present Illness:  Patient is a 49-year-old female with history of anxiety, anemia, depression, diabetes, fibromyalgia, migraines, ovarian cyst, PTSD, among others presenting to the ER with complaints of increased anxiety and depression.  Patient not currently taking any medications for this.  She states that she knows she needs to be evaluated by psych but has been afraid.  She is denying any active suicidal or homicidal ideations.  She states that she is not having any acute physical symptoms.  She does note that she has some musculoskeletal pain that is chronic.  She states she was here the other day for her hip but it is better now.  She states that she does not know if she needs to go inpatient but would be willing if we feel that it is warranted.  She states that she would just like to get some help and set up with a psychiatrist.  Patient does also note that she is on Suboxone. Patient currently at sober living facility.      Nurses Notes reviewed and agree, including vitals, allergies, social history and prior medical history.     REVIEW OF SYSTEMS: All systems reviewed and not pertinent unless noted.  Review of Systems   Psychiatric/Behavioral:  Positive for dysphoric mood. The patient is nervous/anxious.    All other systems reviewed and are negative.      Past Medical History:   Diagnosis Date    Acid reflux     Anemia     Anxiety     Arthritis     Depression     Diabetes mellitus     Fibromyalgia, primary     Fractures     Gout     Hypertension     Migraine headache     Ovarian cyst     PTSD (post-traumatic stress disorder)     Restless leg syndrome     Seizures        Allergies:    Codeine and Penicillins      Past Surgical History:   Procedure Laterality Date    KNEE SURGERY      MANDIBLE FRACTURE SURGERY           Social History     Socioeconomic History    Marital status: Single   Tobacco Use    Smoking status: Former     Current packs/day: 0.00  "    Average packs/day: 1 pack/day for 30.0 years (30.0 ttl pk-yrs)     Types: Cigarettes     Start date:      Quit date:      Years since quittin.2    Smokeless tobacco: Never   Vaping Use    Vaping status: Every Day    Substances: Nicotine, Flavoring   Substance and Sexual Activity    Alcohol use: Yes    Drug use: Not Currently     Comment: opoids    Sexual activity: Not Currently         Family History   Problem Relation Age of Onset    Pancreatic cancer Mother     Heart attack Mother     Mental illness Brother     Arthritis Maternal Aunt     Throat cancer Maternal Aunt     Bone cancer Maternal Aunt     Hypertension Maternal Uncle     Liver cancer Maternal Uncle     Arthritis Paternal Uncle     Osteoporosis Maternal Grandmother     Diabetes Maternal Grandfather        Objective  Physical Exam:  /97   Pulse 86   Temp 98.1 °F (36.7 °C) (Oral)   Resp 19   Ht 165.1 cm (65\")   Wt 72.6 kg (160 lb)   SpO2 98%   BMI 26.63 kg/m²      Physical Exam  Vitals and nursing note reviewed.   Constitutional:       General: She is not in acute distress.     Appearance: She is not toxic-appearing.   HENT:      Head: Normocephalic and atraumatic.      Right Ear: External ear normal.      Left Ear: External ear normal.      Nose: Nose normal.   Eyes:      Extraocular Movements: Extraocular movements intact.      Conjunctiva/sclera: Conjunctivae normal.   Pulmonary:      Effort: Pulmonary effort is normal. No respiratory distress.   Abdominal:      General: There is no distension.   Musculoskeletal:         General: Normal range of motion.      Cervical back: Normal range of motion and neck supple.   Skin:     General: Skin is warm and dry.   Neurological:      General: No focal deficit present.      Mental Status: She is alert and oriented to person, place, and time.   Psychiatric:         Attention and Perception: Attention normal.         Mood and Affect: Mood is depressed.         Behavior: Behavior is not " agitated or aggressive. Behavior is cooperative.         Thought Content: Thought content does not include homicidal or suicidal ideation.         Procedures    ED Course:         Lab Results (last 24 hours)       Procedure Component Value Units Date/Time    Urinalysis With Microscopic If Indicated (No Culture) - Urine, Clean Catch [[id number]]  (Abnormal) Collected: 04/14/24 2306    Specimen: Urine, Clean Catch Updated: 04/14/24 2315     Color, UA Yellow     Appearance, UA Turbid     pH, UA 6.5     Specific Gravity, UA 1.010     Glucose, UA Negative     Ketones, UA Trace     Bilirubin, UA Negative     Blood, UA Negative     Protein, UA Negative     Leuk Esterase, UA Negative     Nitrite, UA Negative     Urobilinogen, UA 0.2 E.U./dL    Narrative:      Urine microscopic not indicated.    Urine Drug Screen - Urine, Clean Catch [[id number]]  (Abnormal) Collected: 04/14/24 2306    Specimen: Urine, Clean Catch Updated: 04/14/24 2333     THC, Screen, Urine Negative     Phencyclidine (PCP), Urine Negative     Cocaine Screen, Urine Negative     Methamphetamine, Ur Negative     Opiate Screen Negative     Amphetamine Screen, Urine Negative     Benzodiazepine Screen, Urine Negative     Tricyclic Antidepressants Screen Negative     Methadone Screen, Urine Negative     Barbiturates Screen, Urine Negative     Oxycodone Screen, Urine Negative     Buprenorphine, Screen, Urine Positive    Narrative:      Cutoff For Drugs Screened:    Amphetamines               500 ng/ml  Barbiturates               200 ng/ml  Benzodiazepines            150 ng/ml  Cocaine                    150 ng/ml  Methadone                  200 ng/ml  Opiates                    100 ng/ml  Phencyclidine               25 ng/ml  THC                         50 ng/ml  Methamphetamine            500 ng/ml  Tricyclic Antidepressants  300 ng/ml  Oxycodone                  100 ng/ml  Buprenorphine               10 ng/ml    The normal value for all drugs tested is negative.  This report includes unconfirmed screening results, with the cutoff values listed, to be used for medical treatment purposes only.  Unconfirmed results must not be used for non-medical purposes such as employment or legal testing.  Clinical consideration should be applied to any drug of abuse test, particularly when unconfirmed results are used.      Fentanyl, Urine - Urine, Clean Catch [[id number]]  (Normal) Collected: 04/14/24 2306    Specimen: Urine, Clean Catch Updated: 04/14/24 2325     Fentanyl, Urine Negative    Narrative:      Negative Threshold:      Fentanyl 5 ng/mL     The normal value for the drug tested is negative. This report includes final unconfirmed screening results to be used for medical treatment purposes only. Unconfirmed results must not be used for non-medical purposes such as employment or legal testing. Clinical consideration should be applied to any drug of abuse test, particularly when unconfirmed results are used.           COVID-19 and FLU A/B PCR, 1 HR TAT - Swab, Nasopharynx [[id number]]  (Normal) Collected: 04/15/24 0006    Specimen: Swab from Nasopharynx Updated: 04/15/24 0033     COVID19 Not Detected     Influenza A PCR Not Detected     Influenza B PCR Not Detected    Narrative:      Fact sheet for providers: https://www.fda.gov/media/615616/download    Fact sheet for patients: https://www.fda.gov/media/044013/download    Test performed by PCR.    CBC & Differential [[id number]]  (Abnormal) Collected: 04/15/24 0036    Specimen: Blood Updated: 04/15/24 0040    Narrative:      The following orders were created for panel order CBC & Differential.  Procedure                               Abnormality         Status                     ---------                               -----------         ------                     CBC Auto Differential[[id number]]        Abnormal            Final result                 Please view results for these tests on the individual orders.    Comprehensive  Metabolic Panel [[id number]]  (Abnormal) Collected: 04/15/24 0036    Specimen: Blood Updated: 04/15/24 0058     Glucose 101 mg/dL      BUN 6 mg/dL      Creatinine 0.66 mg/dL      Sodium 137 mmol/L      Potassium 3.6 mmol/L      Chloride 100 mmol/L      CO2 25.1 mmol/L      Calcium 9.4 mg/dL      Total Protein 7.0 g/dL      Albumin 4.2 g/dL      ALT (SGPT) 8 U/L      AST (SGOT) 12 U/L      Alkaline Phosphatase 57 U/L      Total Bilirubin 0.3 mg/dL      Globulin 2.8 gm/dL      A/G Ratio 1.5 g/dL      BUN/Creatinine Ratio 9.1     Anion Gap 11.9 mmol/L      eGFR 109.0 mL/min/1.73     Narrative:      GFR Normal >60  Chronic Kidney Disease <60  Kidney Failure <15      Acetaminophen Level [[id number]]  (Normal) Collected: 04/15/24 0036    Specimen: Blood Updated: 04/15/24 0058     Acetaminophen <5.0 mcg/mL     Narrative:      Toxic = Greater than 150 mcg/mL    Ethanol [[id number]] Collected: 04/15/24 0036    Specimen: Blood Updated: 04/15/24 0058     Ethanol <10 mg/dL      Ethanol % <0.010 %     Narrative:      This result is for medical use only and should not be used for forensic purposes.    Salicylate Level [[id number]]  (Normal) Collected: 04/15/24 0036    Specimen: Blood Updated: 04/15/24 0058     Salicylate <0.3 mg/dL     CBC Auto Differential [[id number]]  (Abnormal) Collected: 04/15/24 0036    Specimen: Blood Updated: 04/15/24 0040     WBC 8.28 10*3/mm3      RBC 4.63 10*6/mm3      Hemoglobin 13.4 g/dL      Hematocrit 39.1 %      MCV 84.4 fL      MCH 28.9 pg      MCHC 34.3 g/dL      RDW 13.1 %      RDW-SD 40.0 fl      MPV 9.7 fL      Platelets 325 10*3/mm3      Neutrophil % 46.3 %      Lymphocyte % 45.8 %      Monocyte % 5.2 %      Eosinophil % 2.3 %      Basophil % 0.2 %      Immature Grans % 0.2 %      Neutrophils, Absolute 3.83 10*3/mm3      Lymphocytes, Absolute 3.79 10*3/mm3      Monocytes, Absolute 0.43 10*3/mm3      Eosinophils, Absolute 0.19 10*3/mm3      Basophils, Absolute 0.02 10*3/mm3      Immature Grans,  Absolute 0.02 10*3/mm3      nRBC 0.0 /100 WBC     TSH [[id number]] Collected: 04/15/24 0036    Specimen: Blood Updated: 04/15/24 0128             No radiology results from the last 24 hrs       MDM  Patient evaluated in the ER for anxiety and depression.  She is hemodynamically stable, afebrile, nontoxic-appearing on exam.  No physical complaints today.  Differential diagnosis includes but is not limited to depression, bipolar disorder, anxiety, among others.  Initial plan includes behavioral health evaluation.  Patient has no homicidal or suicidal ideations and suspect that she may be able to be discharged with outpatient referrals.  Will discuss with behavioral health prior to ordering medical clearance labs/work-up.    Behavioral health has evaluated the patient and do not know if she would meet inpatient criteria but someone from her sober living facility has requested she go inpatient.  Mati with behavioral health team has spoken with someone at the sober living facility and patient is agreeable with going inpatient so behavioral health team planning for admission. Medical clearance labs and workup ordered.    ER workup unremarkable.  Patient accepted for admission to [facility].  She was discharged to be transferred upstairs for inpatient psychiatric hospitalization.    Final diagnoses:   Anxiety   Paranoia          Shanna Almaraz PA-C  04/15/24 0148

## 2024-04-15 NOTE — SIGNIFICANT NOTE
Therapist attempted to meet with patient to complete evaluation. Patient struggling to remain awake due to symptom burden so evaluation will be completed at a later time.

## 2024-04-15 NOTE — CONSULTS
Alicia Martines  1975    Preferred Pronouns: she/her  Race/Ethnicity: White or   Martial Status: Single  Guardian Name/Contact/etc: Self  Pt Lives With:  Currently at a sober living home [facility]  Occupation: unemployed  Appearance: appropriately dressed     Time Called for Assessment: 21:30  Assessment Start and End: 21:43-22:18      DATA:   Clinician received a call from [facility] staff for a behavioral health consult.  The patient is agreeable to speak with the behavioral health team.  Met with patient at bedside. Patient is not under 1:1 security monitoring.  The attending treatment team is Vickie Waldrop RN and Shanna Almaraz PA-C, Provider.  Patient presents today with chief compliant of anxiety and paranoia.  Therapist spoke with Georgia at [facility].  Georgia stated that patient will not take her medication and has shown paranoid behaviors. Georgia reported that patient has gone outside at night and has taken a stick and tried to dig out the fence. Georgia stated that she has been diagnosed with schizophrenia.   Clinician completed assessment with patient and observations are documented as follows.    ASSESSMENT:    Clinician consulted with patient for mental status exam and assessment.  Clinical descriptors are documented as follows.  Clinician completed CSSRS with patient for suicide risk assessment.  The results of patient’s CSSRS documented as follows.    Presenting Problems: Patient stated that she lives at a sober living home and has paranoia and believes that someone is going to kill her. Patient reported that she has a fear of taking medications. Patient stated that she doesn't take her medications because she is afraid she will go to sleep and not wake up. Patient stated she has a history of hallucinations, but denies any at this time. Patient denies having thoughts or plans to kill herself or anyone else. Patient has a history of SI. Patient reported intentional/unintentional  overdose. Patient is tearful and anxious, reports having nightmares.    Current Stressors: mental health condition Substance abuse history     Established Therapy, Medication Management or Other Mental Health Services: Patient reported that she does not have any services at this time. Georgia at  reported that she has a Psych APRN that prescribes medication.    Current Psychiatric Medications: patient reported on Suboxone, Flexerill, Clonodine, and Vistaril  cyclobenzaprine (FLEXERIL) tablet 10 mg (04/09/2024)   buprenorphine-naloxone (SUBOXONE) 8-2 MG per SL tablet (04/01/2024)   hydrOXYzine pamoate (VISTARIL) 25 MG capsule (04/01/2024)   cloNIDine (CATAPRES) 0.1 MG tablet (04/01/2024)       Mental Status Exam:  Behavior: Withdrawn  Psychomotor Movement: Slow  Attention and Cooperation: Normal and Evasive and guarded  Mood: despairing and Affect: Flat  Orientation: alert and oriented to person, place, and time   Thought Process: linear, logical, and goal directed  Thought Content: paranoid  Delusions: Patient believes that someone is out to kill her   Hallucinations: Patient stated that she has had hallucinations of voices and visuals,but none demonstrated today   Concentration: Normal  Suicidal Ideation: Absent  Homicidal Ideation: Absent  Hopelessness: Moderate  Speech: Monotone  Eye Contact: Downcast  Insight: Fair  Judgement:  Fiar    Depression: 6   Anxiety: 9  Sleep: Interrupted  patient stated that she does not sleep but 1-2 hours a night  Appetite:  Not wanting to eat        Hx of Psychiatric or Detox Hospitalizations:  Yes, describe: Patient stated that she has been to both Psych and Rehab  Most recent inpatient admission: Patient could not remember    Suicidal Ideation Assessment:    COLUMBIA-SUICIDE SEVERITY RATING SCALE  Psychiatric Inpatient Setting - Discharge Screener    Ask questions that are bold and underlined Discharge   Ask Questions 1 and 2 YES NO   Wish to be Dead:   Person endorses  thoughts about a wish to be dead or not alive anymore, or wish to fall asleep and not wake up.  While you were here in the hospital, have you wished you were dead or wished you could go to sleep and not wake up?  X   Suicidal Thoughts:   General non-specific thoughts of wanting to end one's life/die by suicide, “I've thought about killing myself” without general thoughts of ways to kill oneself/associated methods, intent, or plan.   While you were here in the hospital, have you actually had thoughts about killing yourself?   X   If YES to 2, ask questions 3, 4, 5, and 6.  If NO to 2, go directly to question 6   3) Suicidal Thoughts with Method (without Specific Plan or Intent to Act):   Person endorses thoughts of suicide and has thought of a least one method during the assessment period. This is different than a specific plan with time, place or method details worked out. “I thought about taking an overdose but I never made a specific plan as to when where or how I would actually do it….and I would never go through with it.”   Have you been thinking about how you might kill yourself?      4) Suicidal Intent (without Specific Plan):   Active suicidal thoughts of killing oneself and patient reports having some intent to act on such thoughts, as opposed to “I have the thoughts but I definitely will not do anything about them.”   Have you had these thoughts and had some intention of acting on them or do you have some intention of acting on them after you leave the hospital?      5) Suicide Intent with Specific Plan:   Thoughts of killing oneself with details of plan fully or partially worked out and person has some intent to carry it out.   Have you started to work out or worked out the details of how to kill yourself either for while you were here in the hospital or for after you leave the hospital? Do you intend to carry out this plan?        6) Suicide Behavior    While you were here in the hospital, have you done  anything, started to do anything, or prepared to do anything to end your life?    Examples: Took pills, cut yourself, tried to hang yourself, took out pills but didn't swallow any because you changed your mind or someone took them from you, collected pills, secured a means of obtaining a gun, gave away valuables, wrote a will or suicide note, etc.  X     Suicidal: Absent (If present, describe frequency of thoughts, patient's perception of impulse control, plan or behavior details, etc)  Previous Attempts:  patient reported attempts but did not elaborate on how many  Most Recent Attempt: several years ago    Psychosocial History    Highest Level of Education: [employer]   Family Hx of Mental Health/Substance Abuse: Yes, describe: Patient reports son has schizophrenia  Patient Trauma/Abuse History: Patient did not disclose    Does this require reporting: No  Patient Identified Support System (List family members, loved ones, guardians, friends, etc): Did not disclose Family lives in [place].     Legal History / History of Violence: None known   Experience with Interpersonal Violence: No  History of Inappropriate Sexual Behavior: No  Current Medical Conditions or Biomedical Complications: Per Chart History of Seizures    Social Determinants of Health  Housing Instability and/or Utility Needs: No  Food Insecurity: No  Transportation Needs: Yes, describe: Patient is at a Sober Living facility and does not have any transportation    Substance Use History  Active Use: No  History of Use: Patient stated history of methamphetamine use, cocaine use, Opiate use,  and Alcohol use. Last time used substances was 2 months ago    PLAN:  At this time, clinician recommends inpatient treatment based upon the above assessment.   Clinician collaborated with the treatment team who agree to adopt the recommendations.  Clinician discussed recommendations with patient and/or patient support systems, and patient is agreeable to the plan.  Patient  is agreeable for referrals to be sent to facilities and agencies for treatment.    Have the levels of care been discussed with the patient? Yes  Level of care recommendation: inpatient  Is patient agreeable to treatment? Yes      Care Coordination Timeline:  22:18-00:58 waiting on labs to be drawn and resulted  01:20 Presented to Dr. Ocampo and [facility] team. Patient accepted  01:21 Therapist updated ED Treatment team and Facilitated RN to RN reporting to Lead RN of [facility] at Ext [phone]  01:22 Therapist updated patient who continues to be willing, but anxious and fearful, to admission      SIGNATURE  Mati Bonds MA LPCA  04/15/2024

## 2024-04-15 NOTE — H&P
INITIAL PSYCHIATRIC HISTORY & PHYSICAL    Patient Identification:  Name:  Alicia Martines  Age:  49 y.o.  Sex:  female  :  1975  MRN:  [id number]   Visit Number:  [id number]  Primary Care Physician:  Provider, No Known    This provider is located at her home address in [place]. on file with [facility]. This visit is being done on behalf of [facility] using a secure platform for a video visit in a secure and private environment. The Patient is located at [facility]-on a locked Behavioral Health unit. The patient's condition being diagnosed/treated is appropriate for telemedicine. The provider identified herself as well as her credentials. The patient, and/or patient's guardian, consent to being seen remotely, and when consent is given they understand that the consent allows for patient identifiable information to be sent to a third party as needed. They may refuse to be seen remotely at any time. The electronic data is encrypted and password protected, and the patient and/or guardian has been advised of the potential risks to privacy not withstanding such measures.        CC/Focus of Exam: Psychosis    HPI: Alicia Martines is a 49 y.o. female who was admitted on 4/15/2024 with complaints in the ER of depression and  paranoia.  Sober living house, indicated that patient has not been taking her medication, has been showing paranoid behaviors, that she has a history of schizophrenia.  Was found with a steak outside trying to did out of the fence.   Sober home reports patient on Suboxone, Flexeril, clonidine, and Vistaril.     They were seen in the ER prior to being accepted to this unit labs were completed and reviewed and patient found to be medically stable for admission to this unit.   Admission labs reviewed and unremarkable, with the exception of, patient being on buprenorphine, and showing positive for this.     On interview today patient reports patient  appears sleepy.  She is a poor historian in general. and appears as though at times she is nodding off during interview.  She tells me she did not get much sleep last night with the admission process.    She denies to me that she is taking any medication, or that she is withdrawing from any substances.    She is positive for Suboxone today, and states that she has been taking this since January with success.   However she tells me that she wants to come off of it and does not want this continued at this time.  She does tell me that she is feeling a little bit better today than she was yesterday since getting some Zyprexa.        PAST PSYCHIATRIC HX:  Psych diagnosis: Collateral obtained in the ER report reports possible history of schizophrenia.    Inpatient: Yes, to both inpatient and CD rehab in the past at the [facility].      SUBSTANCE USE HX:  Tobacco: patient denies-states she quit yesterday.  Alcohol: Patient denies   Illicit drug history: Has a history of opiates and speed.    Reports this was last use a couple of years ago.    SOCIAL HX:  Patient has adult children and family who she reports is her support system.   She has been living at [facility]-a sober living. However, patient tells me she has been discharged and not able to go back, however this isn't mentioned in the ER note.s     Past Medical History:   Diagnosis Date    Acid reflux     Anemia     Anxiety     Arthritis     Depression     Diabetes mellitus     Fibromyalgia, primary     Fractures     Gout     Hypertension     Migraine headache     Ovarian cyst     PTSD (post-traumatic stress disorder)     Restless leg syndrome     Seizures      She reports a history of seizures and that she has had grand mal seizures in the past.  Her med rec indications recently on Keppra, patient states she stopped taking in a couple weeks about after having a seizures and doesn't seem concerned about the med one way or another, but is willing to take it.      Past  Surgical History:   Procedure Laterality Date    KNEE SURGERY      MANDIBLE FRACTURE SURGERY         Family History   Problem Relation Age of Onset    Pancreatic cancer Mother     Heart attack Mother     Mental illness Brother     Arthritis Maternal Aunt     Throat cancer Maternal Aunt     Bone cancer Maternal Aunt     Hypertension Maternal Uncle     Liver cancer Maternal Uncle     Arthritis Paternal Uncle     Osteoporosis Maternal Grandmother     Diabetes Maternal Grandfather          Medications Prior to Admission   Medication Sig Dispense Refill Last Dose    albuterol sulfate  (90 Base) MCG/ACT inhaler Two puffs per day every 4-6 hours as needed for wheeze or shortness of breath. 18 g 0 4/14/2024    cloNIDine (CATAPRES) 0.1 MG tablet Take 1 tablet by mouth 2 (Two) Times a Day.   4/14/2024    [facility] Vitamin B-12 1000 MCG tablet Take 1 tablet by mouth Daily.   Past Week    cyclobenzaprine (FLEXERIL) 5 MG tablet Take 1 tablet by mouth 3 (Three) Times a Day As Needed for Muscle Spasms. 9 tablet 0 4/14/2024    ibuprofen (ADVIL,MOTRIN) 600 MG tablet Take 1 tablet by mouth 3 (Three) Times a Day. 15 tablet 0 4/14/2024    lidocaine (LIDODERM) 5 % Place 1 patch on the skin as directed by provider Daily. Remove & Discard patch within 12 hours or as directed by MD   Past Week    buprenorphine-naloxone (SUBOXONE) 8-2 MG per SL tablet  (Patient not taking: Reported on 4/15/2024)   Not Taking    hydrOXYzine pamoate (VISTARIL) 25 MG capsule  (Patient not taking: Reported on 4/15/2024)   Not Taking    levETIRAcetam (KEPPRA) 1000 MG tablet Take 1 tablet by mouth 2 (Two) Times a Day. (Patient not taking: Reported on 4/15/2024) 14 tablet 0 Not Taking    nicotine polacrilex (COMMIT) 4 MG lozenge Dissolve 1 lozenge in the mouth As Needed for Smoking Cessation.   Unknown    omeprazole (priLOSEC) 20 MG capsule TAKE 1 CAPSULE BY MOUTH 30 MINUTES BEFORE MORNING MEAL EVERY DAY FOR 30 DAYS (Patient not taking: Reported on 4/15/2024)    Not Taking    ondansetron ODT (ZOFRAN-ODT) 8 MG disintegrating tablet Place 1 tablet on the tongue Every 8 (Eight) Hours As Needed for Nausea or Vomiting. (Patient not taking: Reported on 4/15/2024) 21 tablet 0 Not Taking    promethazine-dextromethorphan (PROMETHAZINE-DM) 6.25-15 MG/5ML syrup Take 5 mL by mouth 4 (Four) Times a Day As Needed for Cough. (Patient not taking: Reported on 4/15/2024) 118 mL 0 Not Taking    sennosides-docusate (senna-docusate sodium) 8.6-50 MG per tablet Take 1 tablet by mouth Daily As Needed for Constipation.   Unknown    vitamin D (ERGOCALCIFEROL) 1.25 MG (35370 UT) capsule capsule Take 1 capsule by mouth 1 (One) Time Per Week. (Patient not taking: Reported on 4/15/2024)   Not Taking         ALLERGIES:  Codeine, Penicillins, and Wellbutrin [bupropion]    Temp:  [97.9 °F (36.6 °C)-98.4 °F (36.9 °C)] 97.9 °F (36.6 °C)  Heart Rate:  [72-86] 72  Resp:  [16-18] 16  BP: (111-144)/() 114/81    REVIEW OF SYSTEMS:  Review of Systems   General ROS: negative for - fever or malaise  Respiratory ROS: no cough, shortness of breath, or wheezing  Cardiovascular ROS: no chest pain   Gastrointestinal ROS: no abdominal pain, nausea, vomiting or diarrhea  Neuro: negative for seizures  Musculoskeletal: No difficulty moving extremities, or numbness or tingling.  Skin: negative for rash  Appears sleepy and struggles to stay away.      PHYSICAL EXAM:  Physical Exam    This physical exam has been performed remotely in the unit aided of audio/visual communication tools. Nursing staff present and assisted as needed to complete.     Physical Exam:  General: Patient appears awake, alert, and in no acute distress.  Head: Normocephalic, atraumatic  Eyes: EOMI. Conjunctivae and sclerae normal.  Ears: Ears appear intact with no abnormalities noted.   Neck: Trachea midline. No obvious JVD or Thyroid enlargement.   Heart: Vital signs and EKG reviewed   Lungs: Respirations appear to be regular, even and unlabored  with no signs of respiratory distress. No audible wheezing.  Abdomen: No obvious abdominal distension.  MS: Muscle tone appears normal. No gross deformities.  Extremities: No cyanosis or edema noted.  Skin: No visible bleeding, bruising, or rash.  Neurologic:  AAOx4. No involuntary movements observed. Coordination grossly intact.  Gait stable and steady. Moves all extremities without difficulty. Able to follow complex commands.   CN I:    Sense of smell grossly intact  CN II:               Pupils are equal and round. No gross deficits in visual acuity.  CN III IV VI:     Extraocular movements are grossly intact.  CN V:              Facial sensation and strength of muscles of mastication grossly intact.  CN VII:            Facial movements are grossly symmetric. No overt weakness.   CN VIII:           Hearing is grossly intact  CN IX and X:  Grossly intact  CN XI:             SCM and trapezius grossly normal  CN XII:            Grossly intact, tongue midline      MENTAL STATUS EXAM:    Appearance: guarded and sleepy   Behavior: generally Cooperative, unable to provide a detailed history at this time.  poor  eye contact  Psychomotor: No psychomotor agitation noted, No EPS, No motor tics noted  Speech: normal rate, rhythm and tone  Mood: withdrawn   Affect: guarded and flat  Thought Content: no delusional material present  Thought process: goal directed and generally organized.  Suicidality: Denies  Homicidality: No HI  Perception: No AH/VH  Insight: limited  Judgment: limited      Imaging Results (Last 24 Hours)       ** No results found for the last 24 hours. **             ECG/EMG Results (most recent)       None             Lab Results   Component Value Date    GLUCOSE 101 (H) 04/15/2024    BUN 6 04/15/2024    CREATININE 0.66 04/15/2024    EGFRIFNONA >60 07/25/2022    EGFRIFAFRI >60 07/25/2022    BCR 9.1 04/15/2024    CO2 25.1 04/15/2024    CALCIUM 9.4 04/15/2024    ALBUMIN 4.2 04/15/2024    AST 12 04/15/2024     ALT 8 04/15/2024       Lab Results   Component Value Date    WBC 8.28 04/15/2024    HGB 13.4 04/15/2024    HCT 39.1 04/15/2024    MCV 84.4 04/15/2024     04/15/2024       Last Urine Toxicity  More data exists         Latest Ref Rng & Units 4/14/2024 10/3/2022   LAST URINE TOXICITY RESULTS   Amphetamine, Urine Qual Negative Negative  -   Barbiturates Screen, Urine Negative Negative  Negative       Benzodiazepine Screen, Urine Negative Negative  Negative       Buprenorphine, Screen, Urine Negative Positive  <10     182       Cocaine Screen, Urine Negative Negative  Negative       Fentanyl, Urine Negative Negative  Negative       Methadone Screen , Urine Negative Negative  Negative       Methamphetamine, Ur Negative Negative  -      Details          This result is from an external source.    Multiple values from one day are sorted in reverse-chronological order               Brief Urine Lab Results  (Last result in the past 365 days)        Color   Clarity   Blood   Leuk Est   Nitrite   Protein   CREAT   Urine HCG        04/14/24 2306 Yellow   Turbid   Negative   Negative   Negative   Negative                       ASSESSMENT & PLAN:        Paranoia  Unspecified psychosis  Opiate use disorder  Stimulant use disorder  On Suboxone therapy  History of seizures  Tobacco use disorder        -Admit to [facility] for safety and stabilization   -Acclimate to unit and daily schedule   -Patient to attend group therapies as well as participate in individual therapy sessions throughout time on the unit.  -Continue precautions and safety checks  -Order comfort PRN medications.  -Education on risks of smoking tobacco products to be complete during stay, and replacement options made available.  -Physical examinations to be complete and admission labs reviewed  -A treatment plan will be developed and agreed upon by the patient and treatment team.  -Medication reconciliation to be completed by Pharmacist, Nurse and  Psychiatrist. ANJANA to be reviewed if indicated and risk versus benefits as well as alternatives to medication regimens discussed with the patient. Will monitor for side effects during inpatient stay.   -Medications: 1) Restart Keppra for seizure  2) Zyprexa 10mgs BID started on the overnight. 3)Patient tells me she took her Suboxone last yesterday, but that she doesn't wish to restart it at this time. 4) Start COWS assessment.  -Treatment team to discuss case regularly at start discharge planning early on to aid in safe transition to a lower level of care when most appropriate for patient.   -Estimated length of stay at this inpatient level of care is 5 to 7 days.       Psychiatrist:  Erica Stafford MD  04/16/24  00:17 EDT

## 2024-04-15 NOTE — SIGNIFICANT NOTE
04/15/24 1459   Living Situation   Current Living Arrangements not steady   Potentially Unsafe Housing Conditions none   Food Insecurity   Within the past 12 months, you worried that your food would run out before you got the money to buy more. Often true   Within the past 12 months, the food you bought just didn't last and you didn't have money to get more. Sometimes   Transportation Needs   In the past 12 months, has lack of transportation kept you from medical appointments or from getting medications? yes   In the past 12 months, has lack of transportation kept you from meetings, work, or from getting things needed for daily living? Yes   Utilities   In the past 12 months has the electric, gas, oil, or water company threatened to shut off services in your home? Shut off   Abuse Screen (yes response referral indicated)   Feels Unsafe at Home or Work/School no   Feels Threatened by Someone no   Does Anyone Try to Keep You From Having Contact with Others or Doing Things Outside Your Home? no   Physical Signs of Abuse Present no   Financial Resource Strain   How hard is it for you to pay for the very basics like food, housing, medical care, and heating? Very Hard   Employment   Do you want help finding or keeping work or a job? Yes, help finding work   Family and Community Support   If for any reason you need help with day-to-day activities such as bathing, preparing meals, shopping, managing finances, etc., do you get the help you need? I need a lot more help   How often do you feel lonely or isolated from those around you? Always   Education   Preferred Language English   Do you want help with school or training? For example, starting or completing job training or getting a high school diploma, GED or equivalent Yes   Physical Activity   On average, how many days per week do you engage in moderate to strenuous exercise (like a brisk walk)? 2 days   On average, how many minutes do you engage in exercise at this  level? 20 min   Number of minutes of exercise per week (!) 40   Alcohol Use   Q1: How often do you have a drink containing alcohol? Never   Q2: How many drinks containing alcohol do you have on a typical day when you are drinking? None   Q3: How often do you have six or more drinks on one occasion? Never   Stress   Do you feel stress - tense, restless, nervous, or anxious, or unable to sleep at night because your mind is troubled all the time - these days? Very much   Mental Health   Little Interest or Pleasure in Doing Things 2-->more than half the days   Feeling Down, Depressed or Hopeless 0-->not at all   Disabilities   Concentrating, Remembering or Making Decisions Difficulty yes   Doing Errands Independently Difficulty (such as shopping) yes

## 2024-04-15 NOTE — SIGNIFICANT NOTE
04/15/24 1723   Group Therapy Session   Group Attendance attended group session   Time Session Began 1400   Time Session Ended 1500   Group Type psychotherapeutic   Group Topic Covered cognitive activities;cognitive therapy techniques;coping skills/lifestyle management;self care activities   Literature/Videos Given topic handouts   Literature/Videos Given Comments Mental Health Maintenance Plan   Group Session Detail Patient's will identify areas that pose a risk of relapse, and then describe the strategies they can use to handle problems. These sections cover triggers, warning signs, self-care, and coping strategies. Patient's will be asked to describe what it would take for them to return to therapy.   Patient Participation/Contribution cooperative with task;expressed understanding of topic   Patient Participation Detail Patient struggled to stay awake during group.   Affect During Group affect consistent with mood   Degree of Insight moderate

## 2024-04-15 NOTE — PLAN OF CARE
"Goal Outcome Evaluation:  Plan of Care Reviewed With: patient  Patient Agreement with Plan of Care: agrees      Pt rated anxiety 6 and depression a 10 on 1-10 scale, and denied all HRIs at time of assessment. Pt stated that she was unable to sleep well last night due to feeling \"afraid\". Pt unable to elaborate as to what she was afraid of. Pt was observed dozing off several times during assessment with this RN as well as with MD. Pt c/o right knee and hip pain that is typically managed with lidocaine patches. MD notified and lidocaine patches were ordered. No acute events this shift. VSS. Will continue with current plan of care.                                   "

## 2024-04-15 NOTE — PLAN OF CARE
Goal Outcome Evaluation:  Plan of Care Reviewed With: patient  Patient Agreement with Plan of Care: agrees     Progress: no change  Outcome Evaluation: no acute events during shift at this time. pt denies SI/HI/AVH. pt reports anxiety 10/10 & depression 8/10. pt visibly shaking during assessment. pt is very paranoid and scared. pt refused scheduled zyprexa but accepted ativan. pt concerned about sedative effects of medications, pt educated. no other changes in pt condition at this time. continue plan of care.

## 2024-04-15 NOTE — SIGNIFICANT NOTE
04/15/24 1239   Group Therapy Session   Group Attendance attended group session   Time Session Began 1130   Time Session Ended 1220   Total Time (minutes) 50   Group Type recreation   Group Topic Covered anger/conflict management   Group Session Detail Patient participated in activity to promote healthy communication and conflict resolution skills.   Patient Participation/Contribution able to recall/repeat info presented;closed eyes for most of session;cooperative with task;expressed understanding of topic;listened actively;organized   Patient Participation Detail Patient pleasant and engaged somewhat during group. Patient did appear to keep falling asleep during group.   Affect During Group affect consistent with mood;appropriate to situation   Degree of Insight moderate

## 2024-04-15 NOTE — ED NOTES
Bedside report to [facility] RN. Pt transported by [facility] staff and security to the floor.

## 2024-04-15 NOTE — SIGNIFICANT NOTE
04/15/24 1729   Group Therapy Session   Group Attendance attended group session   Time Session Began 1030   Time Session Ended 1100   Total Time (minutes) 30   Group Type psychotherapeutic   Group Topic Covered cognitive activities;cognitive therapy techniques   Literature/Videos Given topic handouts   Group Session Detail Personal Values: Circles of Influence worksheet provides a tool for exploring one’s most important values, as well as those of family, friends, and society. This exercise can help patients explore how other people influence their values, and what unique values they hold.   Patient Participation/Contribution cooperative with task   Patient Participation Detail Literature provided to be completed independently.   Affect During Group affect consistent with mood   Degree of Insight moderate

## 2024-04-16 RX ORDER — BUPRENORPHINE AND NALOXONE 8; 2 MG/1; MG/1
2 FILM, SOLUBLE BUCCAL; SUBLINGUAL DAILY
Status: DISCONTINUED | OUTPATIENT
Start: 2024-04-16 | End: 2024-04-19

## 2024-04-16 RX ORDER — OLANZAPINE 5 MG/1
5 TABLET ORAL ONCE AS NEEDED
Status: COMPLETED | OUTPATIENT
Start: 2024-04-16 | End: 2024-04-17

## 2024-04-16 RX ORDER — CHOLECALCIFEROL (VITAMIN D3) 125 MCG
5 CAPSULE ORAL NIGHTLY PRN
Status: DISCONTINUED | OUTPATIENT
Start: 2024-04-16 | End: 2024-04-19 | Stop reason: HOSPADM

## 2024-04-16 RX ADMIN — NICOTINE POLACRILEX 2 MG: 2 GUM, CHEWING BUCCAL at 21:28

## 2024-04-16 RX ADMIN — LIDOCAINE 1 PATCH: 4 PATCH TOPICAL at 12:48

## 2024-04-16 RX ADMIN — ACETAMINOPHEN 650 MG: 325 TABLET, FILM COATED ORAL at 17:50

## 2024-04-16 RX ADMIN — CYANOCOBALAMIN TAB 500 MCG 1000 MCG: 500 TAB at 08:38

## 2024-04-16 RX ADMIN — OLANZAPINE 10 MG: 5 TABLET, ORALLY DISINTEGRATING ORAL at 20:10

## 2024-04-16 RX ADMIN — LEVETIRACETAM 1000 MG: 500 TABLET, FILM COATED ORAL at 20:10

## 2024-04-16 RX ADMIN — BUPRENORPHINE AND NALOXONE 1 FILM: 8; 2 FILM, SOLUBLE BUCCAL; SUBLINGUAL at 14:13

## 2024-04-16 RX ADMIN — OLANZAPINE 10 MG: 5 TABLET, ORALLY DISINTEGRATING ORAL at 08:37

## 2024-04-16 RX ADMIN — LIDOCAINE 1 PATCH: 4 PATCH TOPICAL at 12:49

## 2024-04-16 RX ADMIN — LORAZEPAM 2 MG: 2 TABLET ORAL at 18:47

## 2024-04-16 RX ADMIN — LEVETIRACETAM 1000 MG: 500 TABLET, FILM COATED ORAL at 08:37

## 2024-04-16 RX ADMIN — HYDROXYZINE HYDROCHLORIDE 50 MG: 25 TABLET, FILM COATED ORAL at 21:31

## 2024-04-16 NOTE — PLAN OF CARE
Problem: Adult Behavioral Health Plan of Care  Goal: Plan of Care Review  Outcome: Ongoing, Progressing  Flowsheets (Taken 4/16/2024 1206)  Progress: improving  Plan of Care Reviewed With: patient  Patient Agreement with Plan of Care: agrees  Outcome Evaluation: New admit. Completed social history and integrated summary.  Goal: Patient-Specific Goal (Individualization)  Outcome: Ongoing, Progressing  Flowsheets  Taken 4/16/2024 1206  Patient-Specific Goals (Include Timeframe): Patient will deny SI/HI prior to discharge. Patient will identify 1 healthy coping skill and consent to appropriate aftercare plan prior to discharge.  Individualized Care Needs: Therapist to offer 1-4 therapy sessions, aftercare, planning, safety planning, family education, group therapy, and brief CBT/MI interventions.  Taken 4/16/2024 1125  Patient Personal Strengths:   resilient   tolerant   motivated for recovery   motivated for treatment   resourceful   realistic evaluation of current/future capabilities  Patient Vulnerabilities:   adverse childhood experience(s)   lacks insight into illness   limited support system   legal concerns   substance abuse/addiction   poor impulse control  Goal: Optimized Coping Skills in Response to Life Stressors  Outcome: Ongoing, Progressing  Intervention: Promote Effective Coping Strategies  Flowsheets (Taken 4/16/2024 1206)  Supportive Measures:   active listening utilized   counseling provided   decision-making supported   goal-setting facilitated   positive reinforcement provided   self-care encouraged   verbalization of feelings encouraged   self-reflection promoted  Goal: Develops/Participates in Therapeutic Umbarger to Support Successful Transition  Outcome: Ongoing, Progressing  Intervention: Foster Therapeutic Umbarger  Flowsheets (Taken 4/16/2024 1206)  Trust Relationship/Rapport:   care explained   choices provided   emotional support provided   empathic listening provided   questions  answered   questions encouraged   reassurance provided   thoughts/feelings acknowledged  Intervention: Mutually Develop Transition Plan  Flowsheets  Taken 4/16/2024 1206  Outpatient/Agency/Support Group Needs:   outpatient medication management   outpatient counseling   outpatient substance abuse treatment (specify)  Transition Support: follow-up care discussed  Anticipated Discharge Disposition: (sober living) other (see comments)  Taken 4/16/2024 1203  Transportation Anticipated: agency  Transportation Concerns: no car  Current Discharge Risk:   psychiatric illness   substance use/abuse  Concerns to be Addressed: mental health  Readmission Within the Last 30 Days: no previous admission in last 30 days  Patient/Family Anticipated Services at Transition: (sober living)   mental health services   other (see comments)  Patient/Family Anticipates Transition to: (sober living) other (see comments)  Offered/Gave Vendor List: yes   Goal Outcome Evaluation:  Plan of Care Reviewed With: patient  Patient Agreement with Plan of Care: agrees     Progress: improving  Outcome Evaluation: New admit. Completed social history and integrated summary.

## 2024-04-16 NOTE — PLAN OF CARE
Goal Outcome Evaluation:  Plan of Care Reviewed With: patient  Patient Agreement with Plan of Care: agrees     Progress: improving  Outcome Evaluation: Pt denies SI/HI/AVH.  Pt reorts anxiety 8/10 and depression 7/10.  Pt given PRN atarax for increased anxiety.  COWS scores at 2000-2, at 0000 and 0400 score was 0, pt was sleeping.  Pt did wake up approx 4 times during shift and seemed confused and unsteady on feet.  Pt had a snack and returned without difficulty.

## 2024-04-16 NOTE — SIGNIFICANT NOTE
04/16/24 1232   Group Therapy Session   Group Attendance attended group session   Time Session Began 1130   Time Session Ended 1210   Total Time (minutes) 40   Group Type recreation   Group Topic Covered cognitive activities   Group Session Detail Patient participated in activity to promote critical thinking.   Patient Participation/Contribution able to recall/repeat info presented;cooperative with task;arrived late;expressed understanding of topic;listened actively;offered helpful suggestions to peers;organized   Patient Participation Detail Patient pleasant and actively engaged when present. Patient came in late from meeting with provider.   Affect During Group affect consistent with mood;appropriate to situation   Degree of Insight high

## 2024-04-16 NOTE — PLAN OF CARE
Goal Outcome Evaluation:      Pt. Denies SI/HI/AVH. She rates her anxiety and depression a 7/10. She reports chronic pain in her hip and knee which she has a lidocaine patch for. Cont w/plan of care.

## 2024-04-16 NOTE — THERAPY EVALUATION
DATA:      Therapist met individually with patient this date to introduce role and to discuss hospitalization expectations. Patient agreeable. Reviewed medical record and staffed case with treatment team this date. No major issues identified.       Clinical Maneuvering/Intervention:     Therapist assisted patient in processing above session content; acknowledged and normalized patient’s thoughts, feelings, and concerns.  Discussed the therapist/patient relationship and explain the parameters and limitations of relative confidentiality.  Also discussed the importance of active participation, and honesty to the treatment process.  Encouraged the patient to discuss/vent their feelings, frustrations, and fears concerning their ongoing medical issues and validated their feelings.     Allowed patient to freely discuss issues without interruption or judgment. Provided safe, confidential environment to facilitate the development of positive therapeutic relationship and encourage open, honest communication.      Concern was voiced regarding substance use and educated patient on risks associated with use. Patient was advised to abstain from use and educated on community resources that can help with sobriety and recovery.      Therapist addressed discharge safety planning this date. Assisted patient in identifying risk factors which would indicate the need for higher level of care after discharge;  including thoughts to harm self or others and/or self-harming behavior. Encouraged patient to call [phone],  [phone], or present to the nearest emergency room should any of these events occur. Discussed crisis intervention services and means to access.  Encouraged securing any objects of harm.       Therapist completed integrated summary, treatment plan, and initiated social history this date.  Therapist is strongly encouraging family involvement in treatment.       ASSESSMENT:      Patient is a 49 year old  female. Patient  "admitted to [facility] 4-15-24 for paranoia. Patient reports that she wasn't sleeping, having reoccurring dreams of things that she believes she has been through in the past. Patient reports she knows some of them are true but some of them are not true. Patient reports she has never been like this. Patient reports that it has been going on for about a year. Patient reports that she was experiencing increased paranoia two years ago. Patient reports that she is fearful of hospitals especially in a \"[place] health [place]\". Patient reports that her sober living she believes does not treat dual diagnosis. Patient desires to return to a sober living that is able to meet her mental health and MICHELE needs.     Goals for treatment: \"I want to get better and understand myself better.\"     Prior Hospitalizations/Dates/current treatment: Patient reports that she has had previous psychiatric stays, but unable to remember when and where.     Childhood History: Patient reports that she had \"a good childhood for the most part.\" Patient reports that there are some things that she has blocked out. Patient reports that she has had a trauma history.       Suicide Attempts: Patient reports that she previously has an overdose attempt that she took a bunch of her medications. Patient reports a few months ago she attempted by taking trazodone as an attempt.     Alcohol: Patient denies any current use. Patient reports \"it's been a long time\" since she has consumed alcohol.    Substance use: Patient reports having substance use issues since she was 17, she would drink/smoke. Patient reports using pills around [age] then at 22 she lost her mother and became more dependent on substances. Patient reports that her drug of choice are opiates. Patient reports she has used methamphetamine in the pass she has used it through IV. Patient reports with opiates she has used IV in the past.     Legal: Patient reports that she believes she has something with child " "support.    Relationship/support: Patient reports her primary support system is her family.    Spiritual: Spiritual/Scientology      Education: 11th.     Access to firearms: Denies.        PLAN:       Patient to remain hospitalized this date.      Treatment team will focus efforts on stabilizing patient's acute symptoms while providing education on healthy coping and crisis management to reduce hospitalizations.   Patient requires daily psychiatrist evaluation and 24/7 nursing supervision to promote patient  safety.     Therapist will offer 1-4 individual sessions, family education, and appropriate referral.     Therapist recommends continued assessment.     Adult Social History (all recorded)       Adult Social History       Row Name 04/16/24 1125       I.  Treatment History    What has motivated you to decide to get treatment now? Patient reports that she wasn't sleeping, having reoccurring dreams of things that she believes she has been through in the past. Patient reports she knows some of them are true but some of them are not true. Patient reports she has never been like this. Patient reports that it has been going on for about a year. Patient reports that she was experiencing increased paranoia two years ago.       II.  Community Resources    Which agencies have you been involved with? [facility]       III.  Home Plan Assessment    Housing status --  Sober Living    Will your living arrangements affect your treatment/recovery? Yes    Financial/Environmental Concerns unemployed       IV.  Psychosocial Systems    What made you stop going to school? \"Lot of things came into factor.\"    Do you want to go back to school? Yes    What would you want to do in the future? \"Anything to help another person, to give back.\"    If not currently employed, when was your last job? 2006    Do you have problems keeping or finding a job?  Yes    Why do you have trouble findng/keeping a job? \"Gaps in employment. " "[place].\"    Do you feel you can eventually go back to work? Yes    Source of Income none    Do you have friends? No       V.  Family of Origin/ Family Attitudes    Describe how you and your family got along when you were growing up Patient reports that she had \"a good childhood for the most part.\" Patient reports that there are some things that she has blocked out. Patient reports that she has had a trauma history.    Do you get along with all family members now? Yes    What influence did growing up in your family have on you? \"I don't know right now.\"    Do you think your family or significant others contribute to your problem(s)/illness? Yes    If yes, how? \"I think it's a good possiblity that they have.\"    How does your family or significant others feel about you getting help? Supportive    Who do you want involved in your treatment/family sessions? Susana Crump, Georgia Goldberg       VII.  Substance Use and Addictive Behaviors -  Please document drug/alcohol specific details in the History Activity    Do you think you have a problem with drugs, alcohol, gambling or other addictive behaviors? Yes    If Yes, what is the problem? Opiates    When using drugs and/or alcohol, which have you encounterd Problems quitting (loss of control);Financial problems;Lots of thought about using;Work or school problems;Inability to stay away from drugs and/or alcohol, gambling or other addictive behavior;Relapse;Family problems;Feelings of guilt or remorse about your use or behavior;Times when you use and/or drink alone    Feelings you have coped with by using drugs and/or alcohol or other addictive bahaviors Depression;Anxiety;Stress;Worry;Insecurity    When coming off of drugs and/or alcohol have you ever had any hallucinations? Yes    What did you see or hear? \"I've seen crazy things.\"    Have you ever had any periods of being completely drug and/or alcohol free (not counting nicotine)? ? Yes    How long? \"I don't keep track of " "it.\"    What is your favorite drug? Opiates.    Patient has attended AA/NA No    Family History of Substance Use none       VII.  Zoroastrian and Spiritual Orientation    Do you believe in a Higher Power? Yes    Who is your Higher Power? Spiritual/Rastafari    Has your drug and/or alcohol, gambling or other addictive bahavior effected your relationship with your Higher Power?  Yes    In what way? \"I don't know how to explain it.\"    Major Change/Loss/Stressor/Fears environment;medical condition, self;resources;school or job;legal concerns    Techniques to [place] with Loss/Stress/Change substance use;diversional activities    What in your life is important to you? \"My kids.\"       IX.   Status    Has patient been in the ? No       X. Other Information    Patient Personal Strengths resilient;tolerant;motivated for recovery;motivated for treatment;resourceful;realistic evaluation of current/future capabilities    Patient Vulnerabilities adverse childhood experience(s);lacks insight into illness;limited support system;legal concerns;substance abuse/addiction;poor impulse control       Determinants     What cultural/environmental/ethnic factors are identified as contributing to the presenting problems? Patient is a 49 year old  female impacted by mental health illness and MICHELE.    What are the factors that effect the patient's emotional level? Depression, Anxiety, MICHELE    What are the facotrs that effect your assessment of patient weaknesses? Ineffective coping, substance abuse    What are the factors the effect your plan for family or living environment involvement? Patient plans to return to sober living upon discharge.    Initial family or living environment contacts made and results Therapist to discuss disposition and safety planning with patient and family upon discharge.    Assessment of family attitudes To be assessed.       Integrated Summary    [facility] Patient is a 49 year old " " female. Patient admitted to [facility] 4-15-24 for paranoia. Therapist completed assessment at this time. Patient reports that she wasn't sleeping, having reoccurring dreams of things that she believes she has been through in the past. Patient reports she knows some of them are true but some of them are not true. Patient reports she has never been like this. Patient reports that it has been going on for about a year. Patient reports that she was experiencing increased paranoia two years ago. Patient reports that she is fearful of hospitals especially in a \"[place] health [place]\". Patient reports that her sober living she believes does not treat dual diagnosis. Patient desires to return to a sober living that is able to meet her mental health and MICHELE needs. Patient is agreeable to having her daughter involved in her treatment.                   "

## 2024-04-17 RX ORDER — NICOTINE 21 MG/24HR
1 PATCH, TRANSDERMAL 24 HOURS TRANSDERMAL
Status: DISCONTINUED | OUTPATIENT
Start: 2024-04-17 | End: 2024-04-19 | Stop reason: HOSPADM

## 2024-04-17 RX ADMIN — LIDOCAINE 1 PATCH: 4 PATCH TOPICAL at 09:39

## 2024-04-17 RX ADMIN — Medication 1 PATCH: at 14:42

## 2024-04-17 RX ADMIN — LEVETIRACETAM 1000 MG: 500 TABLET, FILM COATED ORAL at 09:37

## 2024-04-17 RX ADMIN — OLANZAPINE 5 MG: 5 TABLET, FILM COATED ORAL at 18:12

## 2024-04-17 RX ADMIN — OLANZAPINE 10 MG: 5 TABLET, ORALLY DISINTEGRATING ORAL at 21:14

## 2024-04-17 RX ADMIN — CYANOCOBALAMIN TAB 500 MCG 1000 MCG: 500 TAB at 09:38

## 2024-04-17 RX ADMIN — BUPRENORPHINE AND NALOXONE 2 FILM: 8; 2 FILM, SOLUBLE BUCCAL; SUBLINGUAL at 09:41

## 2024-04-17 RX ADMIN — OLANZAPINE 10 MG: 5 TABLET, ORALLY DISINTEGRATING ORAL at 09:38

## 2024-04-17 RX ADMIN — NICOTINE POLACRILEX 2 MG: 2 GUM, CHEWING BUCCAL at 11:45

## 2024-04-17 RX ADMIN — HYDROXYZINE HYDROCHLORIDE 50 MG: 25 TABLET, FILM COATED ORAL at 13:19

## 2024-04-17 RX ADMIN — Medication 5 MG: at 21:14

## 2024-04-17 RX ADMIN — LEVETIRACETAM 1000 MG: 500 TABLET, FILM COATED ORAL at 21:14

## 2024-04-17 RX ADMIN — LIDOCAINE 1 PATCH: 4 PATCH TOPICAL at 13:26

## 2024-04-17 NOTE — PLAN OF CARE
"Goal Outcome Evaluation:  Plan of Care Reviewed With: patient  Patient Agreement with Plan of Care: agrees      Pt. States that she is \" better\" today. She denies SI/HI. She states that she hears voices of her family telling her that \" she is worthless. \" But she tells herself they aren't real.  She rates her depression 8/10 and anxiety a \" 16/20.\" She further reports that there is \" something on her conscience\" but won't say what it is until she's ready. Cont w/ POC.                                   "

## 2024-04-17 NOTE — SIGNIFICANT NOTE
04/17/24 1243   Group Therapy Session   Group Attendance attended group session   Time Session Began 1130   Time Session Ended 1210   Total Time (minutes) 40   Group Type recreation   Group Topic Covered mindfulness;leisure exploration/use of leisure time   Group Session Detail Patient participated in origami activity and discussion to promote importance of hope in health recovery.   Patient Participation/Contribution able to recall/repeat info presented;cooperative with task;discussed personal experience with topic;expressed understanding of topic;listened actively;organized   Patient Participation Detail Patient pleasant but confused at times during instructions and needed additional assistance. Patient could be observed falling asleep at times during group.   Affect During Group affect consistent with mood;appropriate to situation   Degree of Insight moderate

## 2024-04-17 NOTE — PROGRESS NOTES
INPATIENT PSYCHIATRIC PROGRESS NOTE    Name:  Alicia Martines  :  1975  MRN:  [id number]  Visit Number:  [id number]  Length of stay:  2    SUBJECTIVE    CC/Focus of Exam: Inpatient follow up    INTERVAL HISTORY:   Patient seen chart reviewed and case discussed in treatment team. Staff report no major behavioral problems overnight.  Patient attending groups and appropriate with peers and staff on the unit.    PRNs overnight given: Hydroxyzine    On interview today patient tells me she is doing some better today.  She does admit to struggling yesterday and getting her thoughts out.  She reports to me a history of bipolar, and tells me she feels this is a crash after a manic high.   Patient's level of sedation does show improvement today.   She tells me that there is something else that she has been thinking about talking with me, however she says she is not able to get her thoughts together enough at this time to recall what it was.    She is interested in restarting her Suboxone.       Review of Systems    No dizziness, no seizures, no headaches, no nausea/vomiting.    OBJECTIVE      PHYSICAL EXAM:  Vital signs: Reviewed and listed below  Gait and station: Steady   Muscle tone/strength: Symmetrical movements of extremities    Temp:  [97.9 °F (36.6 °C)-98.3 °F (36.8 °C)] 97.9 °F (36.6 °C)  Heart Rate:  [76-78] 78  Resp:  [16] 16  BP: ()/(68-84) 99/84    MENTAL STATUS EXAM:  Appearance: Casually dressed, fair hygiene.   Behavior: Cooperative with interview, good eye contact  Psychomotor: No psychomotor agitation, No EPS reported or observed  Speech: Regular rate, rhythm and tone.  Mood:  Euthymic  Affect: Congruent  Thought Content: Does appear to be struggling with a thought disorder at this time.  Thought process: generally goal directed, but struggling to get complete and detailed thoughts out.  Suicidality: Denies  Homicidality: No HI  Perception: No AH/VH  Insight: fair   Judgment: limited  · S/p bedside debridement with surgery 11/26  · Please see above plan for sepsis       Lab Results (last 24 hours)       ** No results found for the last 24 hours. **               Imaging Results (Last 24 Hours)       ** No results found for the last 24 hours. **               ECG/EMG Results (most recent)       None             ALLERGIES: Codeine, Penicillins, and Wellbutrin [bupropion]      Current Facility-Administered Medications:     acetaminophen (TYLENOL) tablet 650 mg, 650 mg, Oral, Q4H PRN, Lane Ocampo MD, 650 mg at 04/16/24 1750    albuterol sulfate HFA (PROVENTIL HFA;VENTOLIN HFA;PROAIR HFA) inhaler 2 puff, 2 puff, Inhalation, Q6H PRN, Erica Stafford MD    aluminum-magnesium hydroxide-simethicone (MAALOX MAX) 400-400-40 MG/5ML suspension 15 mL, 15 mL, Oral, Q6H PRN, Lane Ocampo MD    buprenorphine-naloxone (SUBOXONE) 8-2 MG film 2 film, 2 film, Sublingual, Daily, Erica Stafford MD, 1 film at 04/16/24 1413    diphenhydrAMINE (BENADRYL) capsule 50 mg, 50 mg, Oral, Q6H PRN, Lane Ocampo MD    diphenhydrAMINE (BENADRYL) injection 50 mg, 50 mg, Intramuscular, Q6H PRN, Lane Ocampo MD    haloperidol (HALDOL) tablet 5 mg, 5 mg, Oral, Q6H PRN, Lane Ocampo MD    haloperidol lactate (HALDOL) injection 5 mg, 5 mg, Intramuscular, Q6H PRN, Lane Ocampo MD    hydrOXYzine (ATARAX) tablet 50 mg, 50 mg, Oral, Q6H PRN, Lane Ocampo MD, 50 mg at 04/16/24 2131    levETIRAcetam (KEPPRA) tablet 1,000 mg, 1,000 mg, Oral, BID, Erica Stafford MD, 1,000 mg at 04/16/24 2010    Lidocaine 4 % 1 patch, 1 patch, Transdermal, Q24H, Erica Stafford MD, 1 patch at 04/16/24 1249    Lidocaine 4 % 1 patch, 1 patch, Transdermal, Q24H, Erica Stafford MD, 1 patch at 04/16/24 1248    loperamide (IMODIUM) capsule 2 mg, 2 mg, Oral, Q2H PRN, Lane Ocampo MD    LORazepam (ATIVAN) injection 2 mg, 2 mg, Intramuscular, Q6H PRN, Lane Ocampo MD    LORazepam (ATIVAN) tablet 2 mg, 2 mg, Oral, Q6H PRN, Lane Ocampo MD, 2 mg at 04/16/24 1843    magnesium hydroxide (MILK OF MAGNESIA) suspension 10 mL, 10 mL,  Oral, Daily PRN, Lane Ocampo MD    melatonin tablet 5 mg, 5 mg, Oral, Nightly PRN, Erica Stafford MD    nicotine polacrilex (NICORETTE) gum 2 mg, 2 mg, Mouth/Throat, Q2H PRN, Erica Stafford MD, 2 mg at 04/16/24 2128    OLANZapine (zyPREXA) tablet 5 mg, 5 mg, Oral, Once PRN, Erica Stafford MD    OLANZapine zydis (zyPREXA) disintegrating tablet 10 mg, 10 mg, Oral, BID, Lane Ocampo MD, 10 mg at 04/16/24 2010    traZODone (DESYREL) tablet 50 mg, 50 mg, Oral, Nightly PRN, Lane Ocampo MD    vitamin B-12 (CYANOCOBALAMIN) tablet 1,000 mcg, 1,000 mcg, Oral, Daily, Erica Stafford MD, 1,000 mcg at 04/16/24 0838          ASSESSMENT & PLAN:      Progress towards treatment plans and goals: Compliant with medications, attending groups, and individual therapy.  Rationale for continued level of care: Patient continues to need inpatient level of care for stabilization of psychiatric symptoms.  Patient would potentially decompensate further at a lower level of care.  She continues to show some indication of a thought disorder.      Diagnosis:      Paranoia  History of bipolar      Plan:    -Continue hospitalization for safety and stabilization.  -Continue current precautions and safety checks.  -Encourage group participation in therapeutic activities on the unit to increase coping skills for stressful life events.  -Continue individual therapy.  -Continue to discuss case in treatment team meetings and continue discharge planning.  -Risk versus benefit as well as alternatives have been discussed with the patient.  We will continue to monitor for negative and positive effects to medications.  Patient agrees to make the following changes in     Medications: We will restart her Suboxone.  This has been verified through the pharmacy and [facility] report.    2      buprenorphine-naloxone, 2 film, Sublingual, Daily  levETIRAcetam, 1,000 mg, Oral, BID  Lidocaine, 1 patch, Transdermal, Q24H  Lidocaine, 1 patch, Transdermal,  Q24H  OLANZapine zydis, 10 mg, Oral, BID  cyanocobalamin, 1,000 mcg, Oral, Daily          I spent 20 minutes before, during and after on this encounter date of service, discussing case with treatment team, reviewing chart, interviewing and evaluating the patient, educating and counseling the patient, assessing patients immediate risk, weighing risks associated with most appropriate level of care, formulating assessment and plan, documentation, writing orders, and communication with RN and staff.      Psychiatrist:  Erica Stafford MD  04/17/24  00:25 EDT

## 2024-04-17 NOTE — PLAN OF CARE
"Goal Outcome Evaluation:  Plan of Care Reviewed With: patient  Patient Agreement with Plan of Care: agrees     Progress: no change  Outcome Evaluation: Pt denies SI/HI/AVH.  Pt denies hallucinations at present but states that she has had them in past when off of medications and that she \"don't need to put in padded room, I just need to be talked through it\".  Pt reports anxiety/depression 8/10.  Pt reports she is worried about losing spot at sober living facility.  Pt reports continued difficulty concentrating/focusing due to being \"groggy\" but states she is able to \"push through\".  Pt up multiple times throughout night and c/o difficulty sleeping and reported bad dreams.  Pt able to easily be re-directed back to bed.  Pt did receive atarax for increased anxiety and nicotine gum. Will continue plan of care.                               "

## 2024-04-18 RX ADMIN — CYANOCOBALAMIN TAB 500 MCG 1000 MCG: 500 TAB at 09:44

## 2024-04-18 RX ADMIN — HYDROXYZINE HYDROCHLORIDE 50 MG: 25 TABLET, FILM COATED ORAL at 20:31

## 2024-04-18 RX ADMIN — LEVETIRACETAM 1000 MG: 500 TABLET, FILM COATED ORAL at 20:31

## 2024-04-18 RX ADMIN — Medication 5 MG: at 20:31

## 2024-04-18 RX ADMIN — LEVETIRACETAM 1000 MG: 500 TABLET, FILM COATED ORAL at 09:44

## 2024-04-18 RX ADMIN — ACETAMINOPHEN 650 MG: 325 TABLET, FILM COATED ORAL at 05:44

## 2024-04-18 RX ADMIN — OLANZAPINE 10 MG: 5 TABLET, ORALLY DISINTEGRATING ORAL at 20:31

## 2024-04-18 RX ADMIN — ACETAMINOPHEN 650 MG: 325 TABLET, FILM COATED ORAL at 15:52

## 2024-04-18 RX ADMIN — BUPRENORPHINE AND NALOXONE 2 FILM: 8; 2 FILM, SOLUBLE BUCCAL; SUBLINGUAL at 09:44

## 2024-04-18 RX ADMIN — Medication 1 PATCH: at 21:09

## 2024-04-18 RX ADMIN — LORAZEPAM 2 MG: 2 TABLET ORAL at 12:41

## 2024-04-18 RX ADMIN — OLANZAPINE 10 MG: 5 TABLET, ORALLY DISINTEGRATING ORAL at 11:26

## 2024-04-18 RX ADMIN — LIDOCAINE 1 PATCH: 4 PATCH TOPICAL at 09:45

## 2024-04-18 NOTE — PLAN OF CARE
Goal Outcome Evaluation:  Plan of Care Reviewed With: patient  Patient Agreement with Plan of Care: agrees    Pt. Denies SI/HI/AVH. She rates her depression 8/10 and anxiety 8/10. She reports that her anxiety increases around people, but that it is good for her to interact with others. Pt. Reports eating and sleeping well. Pt. Rates her hip/leg pain as 7/10. She reports throwing her lidocaine patch away and not being able to find it. MD informed. Pt. Instructed to bring her lidocaine patch to nurse before receiving a new one. She verb understanding. Cont w/plan of care.

## 2024-04-18 NOTE — THERAPY TREATMENT NOTE
DATA:    Therapist met with the patient individually. Therapist continues reviewing plan of care and aftercare plan.  The patient was agreeable.    ASSESSMENT:    Patient was seen for follow up of paranoia.    Today, patient was seen 1-1 in office. The patient's mood appears appropriate to circumstances, affect congruent, thought content normal. Patient reports sleep is Interrupted and appetite is Excessive. On scale 1-10, patient rates depression 5 and anxiety 8. Patient does/does not report hallucinations: None. Patient reports that she is feeling better than she has previously been feeling. Patient reports that she did have bad dreams last night. Patient reports overall she feels like she's improving. Patient reports that she has been struggling with focus. Patient denies SI/HI/AVH.       CLINICAL MANEUVERING:    Therapist provided safe, secure environment for patient to share.  Provided reflective listening and psychoeducation.  Assisted patient in processing the above session. Assisted patient in identifying any questions or needs to be addressed today. Therapist normalized and validated patient's feelings. Therapist utilized supportive psychotherapy.    Concern was voiced regarding substance use and educated patient on risks associated with use. Patient was advised to abstain from use and educated on community resources that can help with sobriety and recovery.        Plan:     Patient to remain hospitalized this date.      Treatment team will focus efforts on stabilizing patient's acute symptoms while providing education on healthy coping and crisis management to reduce hospitalizations.   Patient requires daily psychiatrist evaluation and 24/7 nursing supervision to promote patient  safety.     Therapist will offer 1-4 individual sessions, family education, and appropriate referral.     Therapist recommends continued assessment. Patient to follow up with [place] upon discharge.

## 2024-04-18 NOTE — PROGRESS NOTES
"INPATIENT PSYCHIATRIC PROGRESS NOTE    Name:  Alicia Martines  :  1975  MRN:  [id number]  Visit Number:  [id number]  Length of stay:  2      SUBJECTIVE    CC/Focus of Exam: Inpatient follow up    INTERVAL HISTORY:   Patient seen chart reviewed and case discussed in treatment team. Staff report no major behavioral problems overnight.  Patient attending groups and appropriate with peers and staff on the unit.    PRNs overnight given: Nicotine and Ativan.    On interview today patient tells me that she is feeling better today.  She is able to answer questions about her CD treatment program.  And where she would like to go to when she is stabilized.   She tells me that her drug of choice has always been opiates, and that Suboxone does help her.  She states that in her past when she did not have opiates she would look for speed.  She states her last use of speed was beginning of the year.    She reports that her thoughts are coming together that her, and that they are not as fast as they were.       Review of Systems    No dizziness, no seizures, no headaches, no nausea/vomiting.    OBJECTIVE      PHYSICAL EXAM:  Vital signs: Reviewed and listed below  Gait and station: Steady   Muscle tone/strength: Symmetrical movements of extremities    Temp:  [98.2 °F (36.8 °C)] 98.2 °F (36.8 °C)  Heart Rate:  [92-99] 99  Resp:  [16] 16  BP: (105-115)/(77-79) 105/77    MENTAL STATUS EXAM:  Appearance: Casually dressed, fair hygiene.   Behavior: Cooperative with interview, good eye contact  Psychomotor: No psychomotor agitation, No EPS reported or observed  Speech: Regular rate, rhythm and tone.  Mood: \"Better\"   Affect: Congruent  Thought Content: no overtdelusional material present  Thought process: generally goal directed  Suicidality: Denies  Homicidality: No HI  Perception: No AH/VH  Insight: fair   Judgment: limited       Lab Results (last 24 hours)       ** No results found for the last 24 hours. **      "          Imaging Results (Last 24 Hours)       ** No results found for the last 24 hours. **               ECG/EMG Results (most recent)       None             ALLERGIES: Codeine, Penicillins, and Wellbutrin [bupropion]      Current Facility-Administered Medications:     acetaminophen (TYLENOL) tablet 650 mg, 650 mg, Oral, Q4H PRN, Lane Ocampo MD, 650 mg at 04/16/24 1750    albuterol sulfate HFA (PROVENTIL HFA;VENTOLIN HFA;PROAIR HFA) inhaler 2 puff, 2 puff, Inhalation, Q6H PRN, Erica Stafford MD    aluminum-magnesium hydroxide-simethicone (MAALOX MAX) 400-400-40 MG/5ML suspension 15 mL, 15 mL, Oral, Q6H PRN, Lane Ocampo MD    buprenorphine-naloxone (SUBOXONE) 8-2 MG film 2 film, 2 film, Sublingual, Daily, Erica Stafford MD, 2 film at 04/17/24 0941    diphenhydrAMINE (BENADRYL) capsule 50 mg, 50 mg, Oral, Q6H PRN, Lane Ocampo MD    diphenhydrAMINE (BENADRYL) injection 50 mg, 50 mg, Intramuscular, Q6H PRN, Lane Ocampo MD    haloperidol (HALDOL) tablet 5 mg, 5 mg, Oral, Q6H PRN, Lane Ocampo MD    haloperidol lactate (HALDOL) injection 5 mg, 5 mg, Intramuscular, Q6H PRN, Lane Ocampo MD    hydrOXYzine (ATARAX) tablet 50 mg, 50 mg, Oral, Q6H PRN, Lane Ocampo MD, 50 mg at 04/17/24 1319    levETIRAcetam (KEPPRA) tablet 1,000 mg, 1,000 mg, Oral, BID, Erica Stafford MD, 1,000 mg at 04/17/24 2114    Lidocaine 4 % 1 patch, 1 patch, Transdermal, Q24H, Erica Stafford MD, 1 patch at 04/17/24 0939    Lidocaine 4 % 1 patch, 1 patch, Transdermal, Q24H, Erica Stafford MD, 1 patch at 04/17/24 1326    loperamide (IMODIUM) capsule 2 mg, 2 mg, Oral, Q2H PRN, Lane Ocampo MD    LORazepam (ATIVAN) injection 2 mg, 2 mg, Intramuscular, Q6H PRN, Lane Ocampo MD    LORazepam (ATIVAN) tablet 2 mg, 2 mg, Oral, Q6H PRN, Lane Ocampo MD, 2 mg at 04/16/24 1847    magnesium hydroxide (MILK OF MAGNESIA) suspension 10 mL, 10 mL, Oral, Daily PRN, Lane Ocampo MD    melatonin tablet 5 mg, 5 mg, Oral, Nightly PRN,  Erica Stafford MD, 5 mg at 04/17/24 2114    nicotine (NICODERM CQ) 21 MG/24HR patch 1 patch, 1 patch, Transdermal, Q24H, Erica Stafford MD, 1 patch at 04/17/24 1442    OLANZapine zydis (zyPREXA) disintegrating tablet 10 mg, 10 mg, Oral, BID, Lane Ocampo MD, 10 mg at 04/17/24 2114    traZODone (DESYREL) tablet 50 mg, 50 mg, Oral, Nightly PRN, Lane Ocampo MD    vitamin B-12 (CYANOCOBALAMIN) tablet 1,000 mcg, 1,000 mcg, Oral, Daily, Erica Stafford MD, 1,000 mcg at 04/17/24 0938          ASSESSMENT & PLAN:          Diagnosis:      Paranoia    Unspecified psychosis  Opiate use disorder  Stimulant use disorder  On Suboxone therapy  History of seizures  Tobacco use disorder         Plan:    -Continue hospitalization for safety and stabilization.  -Continue current precautions and safety checks.  -Encourage group participation in therapeutic activities on the unit to increase coping skills for stressful life events.  -Continue individual therapy.  -Continue to discuss case in treatment team meetings and continue discharge planning.   -Risk versus benefit as well as alternatives have been discussed with the patient.  We will continue to monitor for negative and positive effects to medications.          buprenorphine-naloxone, 2 film, Sublingual, Daily  levETIRAcetam, 1,000 mg, Oral, BID  Lidocaine, 1 patch, Transdermal, Q24H  Lidocaine, 1 patch, Transdermal, Q24H  nicotine, 1 patch, Transdermal, Q24H  OLANZapine zydis, 10 mg, Oral, BID  cyanocobalamin, 1,000 mcg, Oral, Daily          I spent 15 minutes before, during and after on this encounter date of service, discussing case with treatment team, reviewing chart, interviewing and evaluating the patient, educating and counseling the patient, assessing patients immediate risk, weighing risks associated with most appropriate level of care, formulating assessment and plan, documentation, writing orders, and communication with RN and staff.      Psychiatrist:  Erica  MD Rivera  04/17/24  23:32 EDT

## 2024-04-18 NOTE — SIGNIFICANT NOTE
04/16/24 1500   Group Therapy Session   Group Attendance attended group session   Time Session Began 1400   Time Session Ended 1500   Group Type psychotherapeutic   Group Topic Covered cognitive therapy techniques;cognitive activities   Literature/Videos Given topic handouts   Group Session Detail Patients will identify their strengths, and then explore their roles in different areas of life: relationships, professional life, and personal fulfillment. Patients will think about ways in which they currently use their strengths, along with new ways they could begin using them.   Patient Participation/Contribution discussed personal experience with topic;cooperative with task;expressed understanding of topic;listened actively   Affect During Group affect consistent with mood   Degree of Insight moderate

## 2024-04-18 NOTE — PLAN OF CARE
"Goal Outcome Evaluation:  Plan of Care Reviewed With: patient  Patient Agreement with Plan of Care: agrees   Pt reports anxiety of 9/10 and depression of 6/10. Pt denies SI, HI and AVH. Pt reports 6/10 knee and \"chronic\" back pain. Pt reports constipation x 3 days, milk of mag offered but declined by patient. Pt required melatonin and tylenol PRNs overnight. Pt awakened frequently overnight for snacks, drinks and medication, but went back to sleep quickly once returning to room. Pt noted to be stumbling during these awakenings and required reminding to open eyes when ambulating. Will continue plan of care.                                      "

## 2024-04-19 VITALS
WEIGHT: 155.8 LBS | SYSTOLIC BLOOD PRESSURE: 102 MMHG | OXYGEN SATURATION: 100 % | BODY MASS INDEX: 26.6 KG/M2 | RESPIRATION RATE: 16 BRPM | DIASTOLIC BLOOD PRESSURE: 85 MMHG | TEMPERATURE: 97.8 F | HEART RATE: 73 BPM | HEIGHT: 64 IN

## 2024-04-19 PROBLEM — F31.13 BIPOLAR DISORDER, CURRENT EPISODE MANIC, SEVERE: Status: ACTIVE | Noted: 2024-04-19

## 2024-04-19 PROBLEM — F22 PARANOIA: Status: RESOLVED | Noted: 2024-04-15 | Resolved: 2024-04-19

## 2024-04-19 PROBLEM — F11.21 OPIOID DEPENDENCE IN REMISSION: Chronic | Status: ACTIVE | Noted: 2024-04-19

## 2024-04-19 RX ORDER — BUPRENORPHINE AND NALOXONE 8; 2 MG/1; MG/1
2 FILM, SOLUBLE BUCCAL; SUBLINGUAL DAILY
Qty: 14 FILM | Refills: 0 | Status: SHIPPED | OUTPATIENT
Start: 2024-04-20 | End: 2024-04-27

## 2024-04-19 RX ORDER — LEVETIRACETAM 1000 MG/1
1000 TABLET ORAL 2 TIMES DAILY
Qty: 60 TABLET | Refills: 0 | Status: SHIPPED | OUTPATIENT
Start: 2024-04-19

## 2024-04-19 RX ORDER — TRAZODONE HYDROCHLORIDE 50 MG/1
50 TABLET ORAL NIGHTLY PRN
Qty: 30 TABLET | Refills: 0 | Status: SHIPPED | OUTPATIENT
Start: 2024-04-19

## 2024-04-19 RX ORDER — OLANZAPINE 20 MG/1
10 TABLET ORAL 2 TIMES DAILY
Qty: 30 TABLET | Refills: 0 | Status: SHIPPED | OUTPATIENT
Start: 2024-04-19 | End: 2024-05-19

## 2024-04-19 RX ORDER — BUPRENORPHINE AND NALOXONE 8; 2 MG/1; MG/1
2 FILM, SOLUBLE BUCCAL; SUBLINGUAL DAILY
Status: DISCONTINUED | OUTPATIENT
Start: 2024-04-20 | End: 2024-04-19 | Stop reason: HOSPADM

## 2024-04-19 RX ADMIN — Medication 1 PATCH: at 09:35

## 2024-04-19 RX ADMIN — CYANOCOBALAMIN TAB 500 MCG 1000 MCG: 500 TAB at 08:01

## 2024-04-19 RX ADMIN — HYDROXYZINE HYDROCHLORIDE 50 MG: 25 TABLET, FILM COATED ORAL at 05:48

## 2024-04-19 RX ADMIN — BUPRENORPHINE AND NALOXONE 2 FILM: 8; 2 FILM, SOLUBLE BUCCAL; SUBLINGUAL at 08:00

## 2024-04-19 RX ADMIN — ACETAMINOPHEN 650 MG: 325 TABLET, FILM COATED ORAL at 02:41

## 2024-04-19 RX ADMIN — OLANZAPINE 10 MG: 5 TABLET, ORALLY DISINTEGRATING ORAL at 08:00

## 2024-04-19 RX ADMIN — LEVETIRACETAM 1000 MG: 500 TABLET, FILM COATED ORAL at 08:01

## 2024-04-19 NOTE — PROGRESS NOTES
"INPATIENT PSYCHIATRIC PROGRESS NOTE    Name:  Alicia Martines  :  1975  MRN:  [id number]  Visit Number:  [id number]  Length of stay:  3    SUBJECTIVE    CC/Focus of Exam: Inpatient follow up    INTERVAL HISTORY:   Patient seen chart reviewed and case discussed in treatment team. Staff report no major behavioral problems overnight.  Patient attending groups and appropriate with peers and staff on the unit.    PRNs overnight given: Hydroxyzine     On interview today patient tells me patient is cooperative. She feels better this morning.  She is hopeful for her future.   Feels like bill is starting to fear.    Review of Systems    No dizziness, no seizures, no headaches, no nausea/vomiting.    OBJECTIVE      PHYSICAL EXAM:  Vital signs: Reviewed and listed below  Gait and station: Steady   Muscle tone/strength: Symmetrical movements of extremities    Temp:  [98.1 °F (36.7 °C)-98.2 °F (36.8 °C)] 98.1 °F (36.7 °C)  Heart Rate:  [80-83] 80  Resp:  [16] 16  BP: (103-106)/(75-82) 103/82    MENTAL STATUS EXAM:  Appearance: Casually dressed, fair hygiene.   Behavior: Cooperative with interview, good eye contact  Psychomotor: No psychomotor agitation, No EPS reported or observed  Speech: Regular rate, rhythm and tone.  Mood: \"Depressed \"   Affect: Congruent  Thought Content: no delusional material present  Thought process: generally goal directed  Suicidality: Denies  Homicidality: No HI  Perception: No AH/VH  Insight: fair   Judgment: limited       Lab Results (last 24 hours)       ** No results found for the last 24 hours. **               Imaging Results (Last 24 Hours)       ** No results found for the last 24 hours. **               ECG/EMG Results (most recent)       None             ALLERGIES: Codeine, Penicillins, and Wellbutrin [bupropion]      Current Facility-Administered Medications:     acetaminophen (TYLENOL) tablet 650 mg, 650 mg, Oral, Q4H PRN, Lane Ocampo MD, 650 mg at 24 1552    albuterol " sulfate HFA (PROVENTIL HFA;VENTOLIN HFA;PROAIR HFA) inhaler 2 puff, 2 puff, Inhalation, Q6H PRN, Erica Stafford MD    aluminum-magnesium hydroxide-simethicone (MAALOX MAX) 400-400-40 MG/5ML suspension 15 mL, 15 mL, Oral, Q6H PRN, Lane Ocampo MD    buprenorphine-naloxone (SUBOXONE) 8-2 MG film 2 film, 2 film, Sublingual, Daily, Erica Stafford MD, 2 film at 04/18/24 0944    diphenhydrAMINE (BENADRYL) capsule 50 mg, 50 mg, Oral, Q6H PRN, Lane Ocampo MD    diphenhydrAMINE (BENADRYL) injection 50 mg, 50 mg, Intramuscular, Q6H PRN, Lane Ocampo MD    haloperidol (HALDOL) tablet 5 mg, 5 mg, Oral, Q6H PRN, Lane Ocampo MD    haloperidol lactate (HALDOL) injection 5 mg, 5 mg, Intramuscular, Q6H PRN, Lane Ocampo MD    hydrOXYzine (ATARAX) tablet 50 mg, 50 mg, Oral, Q6H PRN, Lane Ocampo MD, 50 mg at 04/18/24 2031    levETIRAcetam (KEPPRA) tablet 1,000 mg, 1,000 mg, Oral, BID, Erica Stafford MD, 1,000 mg at 04/18/24 2031    Lidocaine 4 % 1 patch, 1 patch, Transdermal, Q24H, Erica Stafford MD, 1 patch at 04/18/24 0945    Lidocaine 4 % 1 patch, 1 patch, Transdermal, Q24H, Erica Stafford MD, 1 patch at 04/17/24 1326    loperamide (IMODIUM) capsule 2 mg, 2 mg, Oral, Q2H PRN, Lane Ocampo MD    LORazepam (ATIVAN) injection 2 mg, 2 mg, Intramuscular, Q6H PRN, Lane Ocampo MD    LORazepam (ATIVAN) tablet 2 mg, 2 mg, Oral, Q6H PRN, Lane Ocampo MD, 2 mg at 04/18/24 1241    magnesium hydroxide (MILK OF MAGNESIA) suspension 10 mL, 10 mL, Oral, Daily PRN, Lane Ocampo MD    melatonin tablet 5 mg, 5 mg, Oral, Nightly PRN, Erica Stafford MD, 5 mg at 04/18/24 2031    nicotine (NICODERM CQ) 21 MG/24HR patch 1 patch, 1 patch, Transdermal, Q24H, Erica Stafford MD, 1 patch at 04/18/24 2109    OLANZapine zydis (zyPREXA) disintegrating tablet 10 mg, 10 mg, Oral, BID, Lane Ocampo MD, 10 mg at 04/18/24 2031    traZODone (DESYREL) tablet 50 mg, 50 mg, Oral, Nightly PRN, Lane Ocampo MD    vitamin B-12  (CYANOCOBALAMIN) tablet 1,000 mcg, 1,000 mcg, Oral, Daily, Erica Stafford MD, 1,000 mcg at 04/18/24 0944          ASSESSMENT & PLAN:      Progress towards treatment plans and goals: Compliant with medications, attending groups, and individual therapy.  Rationale for continued level of care: Patient continues to need inpatient level of care for stabilization of psychiatric symptoms.  Patient would potentially decompensate further at a lower level of care.       Diagnosis:      Paranoia        Plan:    -Continue hospitalization for safety and stabilization.  -Continue current precautions and safety checks.  -Encourage group participation in therapeutic activities on the unit to increase coping skills for stressful life events.  -Continue individual therapy.  -Continue to discuss case in treatment team meetings and continue discharge planning.  -Risk versus benefit as well as alternatives have been discussed with the patient.  We will continue to monitor for negative and positive effects to medications.  Patient agrees to make the following changes in     Medications:  NO changes today.       Other:  Continue discharge planning to a safe lower level of care.      buprenorphine-naloxone, 2 film, Sublingual, Daily  levETIRAcetam, 1,000 mg, Oral, BID  Lidocaine, 1 patch, Transdermal, Q24H  Lidocaine, 1 patch, Transdermal, Q24H  nicotine, 1 patch, Transdermal, Q24H  OLANZapine zydis, 10 mg, Oral, BID  cyanocobalamin, 1,000 mcg, Oral, Daily          I spent 22 minutes before, during and after on this encounter date of service, discussing case with treatment team, reviewing chart, interviewing and evaluating the patient, educating and counseling the patient, assessing patients immediate risk, weighing risks associated with most appropriate level of care, formulating assessment and plan, documentation, writing orders, and communication with RN and staff.      Psychiatrist:  Erica Stafford MD  04/18/24  23:59 EDT

## 2024-04-19 NOTE — SIGNIFICANT NOTE
04/19/24 1423   Group Therapy Session   Group Attendance attended group session   Time Session Began 1340   Time Session Ended 1420   Total Time (minutes) 40   Group Type recreation   Group Topic Covered mindfulness;relaxation techniques   Group Session Detail Patient participated in activity to promote grounding and mindfulness skills as coping mechanisms.   Patient Participation/Contribution able to recall/repeat info presented;cooperative with task;discussed personal experience with topic;expressed understanding of topic;listened actively;expressed readiness to alter behaviors   Patient Participation Detail Patient pleasant and engaged throughout group. Patient had good insight during discussion.   Affect During Group affect consistent with mood;appropriate to situation   Degree of Insight high

## 2024-04-19 NOTE — PLAN OF CARE
Goal Outcome Evaluation:  Plan of Care Reviewed With: patient  Patient Agreement with Plan of Care: agrees     Progress: no change  Outcome Evaluation: no acute events during shift at this time. VSS. pt denies SI/HI/AVH. pt reports anxiety 7/10 & depression 8/10. PRN tylenol & melatonin given. no other changes in pt condition at this time. continue plan of care.

## 2024-04-19 NOTE — THERAPY DISCHARGE NOTE
Therapist met 1-1 in the office. Patient calm/cooperative, oriented x 4.  Patient noted to have appropriate affect, congruent mood, normal thought content. Patient denies SI/HI/AVH and acute symptoms.   Safety planning conducted; patient/family denies access to firearms, weapons, medications. Medications were recommended to be safe guarded and for family to administer with patient.     Assisted patient in identifying risk factors which would indicate the need for higher level of care including thoughts to harm self or others and/or self-harming behavior and encouraged patient to call [phone], call 911, or present to the nearest emergency room should any of these events occur. Discussed crisis intervention services and means to access.  Patient adamantly and convincingly denies current suicidal or homicidal ideation or perceptual disturbance.    Therapist completed the following safety plan with the patient       SAFETY/MENTAL HEALTH PLAN    1. Recognizing warning signs: Warning signs that a crisis may be developing such as thoughts, images, mood, situation, behaviors:  Isolate, Increased Depression, Not sleeping, Brigid         2. Internal coping strategies: Things that the patient can do to take their mind off problems without contacting another person such as relaxation techniques, physical activity, etc:  Coloring, Puzzles       3. Socialization strategies for distraction and support: People and social settings that provide distraction or support - names or places, telephone:   Rashmi Gonzalez       4. Social contact for assistance in resolving crisis: People the patient can ask for help - names and telephone:  Vickie Harden (Peer Support)       5. Professionals or agencies contacts to help resolve crisis: Professionals or agencies the patient can contact during a crisis - clinician name/location/phone/emergency contact number, local urgent care services with address/phone, National  Suicide Prevention Lifeline (988), Emergency contact 911:    [facility], 911, 988, crisis resources provided.       6. Means restriction: Secure sharps, medications, safeguard weapons, identified crisis plan, made multiple outpatient provider appointments for follow up care.

## 2024-04-19 NOTE — DISCHARGE INSTRUCTIONS
Patient should continue medications as prescribed at discharge.  It is recommended that patient follow-up with appointments as scheduled.    If dangerous thoughts toward self or others occur, or if safety is a concern please go to the emergency room or call 911 for help.    It is recommended that patient abstain from the use of drugs and/or alcohol following discharge.    Patient is advised to abstain from the use of tobacco products.  -Risk associated with use of tobacco products have been discussed with patient as well as treatment options to aid in discontinuation of use discussed and are available to patient if they choose.

## 2024-04-29 NOTE — DISCHARGE SUMMARY
PSYCHIATRIC DISCHARGE SUMMARY     Patient Identification:  Name:  Alicia Martines  Age:  49 y.o.  Sex:  female  :  1975  MRN:  [id number]  Visit Number:  [id number]    Patient is being seen on this date of discharge using telemedicine technology. This provider is located at her home address in [place]. on file with [facility]. This visit is being done on behalf of [facility] using a secure platform for a video visit in a secure and private environment. The Patient is located at [facility]-on a locked Behavioral Health unit. The patient's condition being diagnosed/treated is appropriate for telemedicine. The provider identified herself as well as her credentials. The patient, and/or patient's guardian, consent to being seen remotely, and when consent is given they understand that the consent allows for patient identifiable information to be sent to a third party as needed. They may refuse to be seen remotely at any time. The electronic data is encrypted and password protected, and the patient and/or guardian has been advised of the potential risks to privacy not withstanding such measures.      Date of Admission:4/15/2024   Date of Discharge:  2024    Discharge Diagnosis:  Bipolar Disorder, most recent episode Manic, Severe  Opiate use disorder  Stimulant use disorder  On Suboxone therapy  History of seizures  Tobacco use disorder      Admission Diagnosis:  Paranoia [F22]     Hospital Course      Alicia Carr admitted to [facility] at [facility].  Patient was acclimated to the unit.  A comprehensive evaluation and treatment plan were complete and safety and comfort measures implemented.  Home medications were reconciled and controlled substance prescriptions reviewed if present.   Physical examination was completed and admission labs and EKG reviewed in ER prior to admission. Lab results reviewed as part of admission. Physical exam on unit  completed as indicated and documented in H&P.    During hospitalization, patient was noted to be generally cooperative and agreeable to medication management.    Throughout stay patient attended and participated in group therapies on the unit as well as individual therapy with Master level therapist.   Patient was cooperative with staff and peers. Patient was able to perform ADLs without assistance.  No abnormal movements noted and stable gait observed.      The following Medication changes were made: Patient started on zyprexa and this was titrated up to 20mgs daily, she tolerated this well and showed improvement in her psychosis and mood.  She was restarted back on her Suboxone, Trazodone was available and patient voiced this was helpful for her sleep.  She was also discharged on Keppra for her history of seizure disorder, this was a previous medication of hers.     Risk vs benefit and alternatives to medications were discussed with patient, who agreed with the above medication changes and tolerated them without reported side effects.  Patient was compliant with medications.      At time of discharge, patient was medically stable, cognitively intact and had showed improvement in psychiatric functioning.  Patient denied SI, thoughts of self-harm, or HI.  Patient showed no signs or symptoms of overt psychosis.  And denied auditory or visual hallucinations at time of discharge.  And reported no side effects to their medication regiment.  Patient sleeping through the night.  It was felt by the treatment team that patient had reached maximum benefit from inpatient hospitalization and all members agreed patient appropriate for discharge to a lower level of care.       Mental Status Exam upon discharge:   Mood was euthymic   Affect: appropriate and generally stable  Thought Content: no delusional material present.  Thought process: Generally goal directed and organized.  Suicidality: No SI  Homicidality: No  HI  Perception: No AH/VH or overt signs of psychosis  Insight and Judgement: over all improved since admission         Consults:   Consults       No orders found from 3/17/2024 to 4/16/2024.            Pertinent Test Results:  Lab Results (last 72 hours)       ** No results found for the last 72 hours. **              ECG/EMG Results (most recent)       None               EKG: Completed and reviewed in ER prior to admission.    Condition on Discharge:  stable    Vital Signs     102/85  73    Discharge Disposition:  [facility]    Discharge Medications:     Discharge Medications        New Medications        Instructions Start Date   OLANZapine 20 MG tablet  Commonly known as: zyPREXA   10 mg, Oral, 2 Times Daily      traZODone 50 MG tablet  Commonly known as: DESYREL   50 mg, Oral, Nightly PRN             Continue These Medications        Instructions Start Date   albuterol sulfate  (90 Base) MCG/ACT inhaler  Commonly known as: PROVENTIL HFA;VENTOLIN HFA;PROAIR HFA   Two puffs per day every 4-6 hours as needed for wheeze or shortness of breath.      [facility] Vitamin B-12 1000 MCG tablet  Generic drug: cyanocobalamin   1 tablet, Oral, Daily      levETIRAcetam 1000 MG tablet  Commonly known as: KEPPRA   1,000 mg, Oral, 2 Times Daily             Stop These Medications      buprenorphine-naloxone 8-2 MG per SL tablet  Commonly known as: SUBOXONE     cloNIDine 0.1 MG tablet  Commonly known as: CATAPRES     cyclobenzaprine 5 MG tablet  Commonly known as: FLEXERIL     hydrOXYzine pamoate 25 MG capsule  Commonly known as: VISTARIL     ibuprofen 600 MG tablet  Commonly known as: ADVIL,MOTRIN     lidocaine 5 %  Commonly known as: LIDODERM     nicotine polacrilex 4 MG lozenge  Commonly known as: COMMIT     omeprazole 20 MG capsule  Commonly known as: priLOSEC     ondansetron ODT 8 MG disintegrating tablet  Commonly known as: ZOFRAN-ODT     promethazine-dextromethorphan 6.25-15 MG/5ML syrup  Commonly known as:  PROMETHAZINE-DM     sennosides-docusate 8.6-50 MG per tablet  Commonly known as: PERICOLACE     vitamin D 1.25 MG (15609 UT) capsule capsule  Commonly known as: ERGOCALCIFEROL            ASK your doctor about these medications        Instructions Start Date   buprenorphine-naloxone 8-2 MG film film  Commonly known as: SUBOXONE  Ask about: Should I take this medication?   2 films, Sublingual, Daily               Discharge Diet:  Regular healthy balanced diet recommended.    Activity at Discharge:  As tolerated    Follow-up Appointments  Future Appointments   Date Time Provider Department Center   5/20/2024  1:00 PM Bhavani Khan APRN [facility]         Test Results Pending at Discharge   None    Time: I spent   <30 minutes on this discharge activity which included: face-to-face encounter with the patient, reviewing the data in the system, coordination of the care with the nursing staff as well as consultants, documentation, and entering orders.           Ercia Schafer MD  Psychiatrist   [facility]    This is electronically signed by Erica Schafer MD

## 2025-08-13 ENCOUNTER — OFFICE VISIT (OUTPATIENT)
Dept: PSYCHIATRY | Facility: CLINIC | Age: 50
End: 2025-08-13
Payer: COMMERCIAL

## 2025-08-13 VITALS
WEIGHT: 178 LBS | HEIGHT: 64 IN | HEART RATE: 96 BPM | BODY MASS INDEX: 30.39 KG/M2 | OXYGEN SATURATION: 97 % | SYSTOLIC BLOOD PRESSURE: 126 MMHG | DIASTOLIC BLOOD PRESSURE: 84 MMHG

## 2025-08-13 DIAGNOSIS — F41.1 GENERALIZED ANXIETY DISORDER: ICD-10-CM

## 2025-08-13 DIAGNOSIS — F31.31 BIPOLAR AFFECTIVE DISORDER, CURRENTLY DEPRESSED, MILD: Primary | ICD-10-CM

## 2025-08-13 RX ORDER — HYDROXYZINE HYDROCHLORIDE 50 MG/1
TABLET, FILM COATED ORAL
COMMUNITY
Start: 2025-07-01 | End: 2025-08-27

## 2025-08-13 RX ORDER — TOPIRAMATE 100 MG/1
TABLET, FILM COATED ORAL
COMMUNITY
Start: 2025-07-30

## 2025-08-13 RX ORDER — CLONIDINE HYDROCHLORIDE 0.1 MG/1
TABLET ORAL
COMMUNITY
Start: 2025-05-22

## 2025-08-13 RX ORDER — HYDROXYZINE PAMOATE 50 MG/1
50 CAPSULE ORAL
COMMUNITY

## 2025-08-13 RX ORDER — BUPRENORPHINE AND NALOXONE 8; 2 MG/1; MG/1
FILM, SOLUBLE BUCCAL; SUBLINGUAL
COMMUNITY
Start: 2025-08-08

## 2025-08-13 RX ORDER — TOPIRAMATE 50 MG/1
TABLET, FILM COATED ORAL
COMMUNITY
Start: 2025-08-12 | End: 2025-08-27

## 2025-08-13 RX ORDER — DICLOFENAC SODIUM 75 MG/1
TABLET, DELAYED RELEASE ORAL
COMMUNITY
Start: 2025-06-24

## 2025-08-13 RX ORDER — ATOMOXETINE 40 MG/1
CAPSULE ORAL
COMMUNITY
Start: 2025-07-30

## 2025-08-13 RX ORDER — LIDOCAINE 50 MG/G
PATCH TOPICAL
COMMUNITY
Start: 2025-06-24